# Patient Record
Sex: MALE | Race: AMERICAN INDIAN OR ALASKA NATIVE
[De-identification: names, ages, dates, MRNs, and addresses within clinical notes are randomized per-mention and may not be internally consistent; named-entity substitution may affect disease eponyms.]

---

## 2019-08-08 ENCOUNTER — HOSPITAL ENCOUNTER (INPATIENT)
Dept: HOSPITAL 5 - ED | Age: 29
LOS: 3 days | Discharge: HOME | DRG: 379 | End: 2019-08-11
Attending: INTERNAL MEDICINE | Admitting: INTERNAL MEDICINE
Payer: COMMERCIAL

## 2019-08-08 DIAGNOSIS — E03.9: ICD-10-CM

## 2019-08-08 DIAGNOSIS — K58.9: ICD-10-CM

## 2019-08-08 DIAGNOSIS — F12.90: ICD-10-CM

## 2019-08-08 DIAGNOSIS — K57.21: Primary | ICD-10-CM

## 2019-08-08 DIAGNOSIS — K59.00: ICD-10-CM

## 2019-08-08 DIAGNOSIS — Z80.0: ICD-10-CM

## 2019-08-08 DIAGNOSIS — F17.210: ICD-10-CM

## 2019-08-08 LAB
ALBUMIN SERPL-MCNC: 4.7 G/DL (ref 3.9–5)
ALT SERPL-CCNC: 15 UNITS/L (ref 7–56)
BASOPHILS # (AUTO): 0.1 K/MM3 (ref 0–0.1)
BASOPHILS NFR BLD AUTO: 0.5 % (ref 0–1.8)
BILIRUB UR QL STRIP: (no result)
BLOOD UR QL VISUAL: (no result)
BUN SERPL-MCNC: 7 MG/DL (ref 9–20)
BUN/CREAT SERPL: 8 %
CALCIUM SERPL-MCNC: 9.8 MG/DL (ref 8.4–10.2)
EOSINOPHIL # BLD AUTO: 0.2 K/MM3 (ref 0–0.4)
EOSINOPHIL NFR BLD AUTO: 0.9 % (ref 0–4.3)
HCT VFR BLD CALC: 44.6 % (ref 35.5–45.6)
HEMOLYSIS INDEX: 2
HGB BLD-MCNC: 15.4 GM/DL (ref 11.8–15.2)
LYMPHOCYTES # BLD AUTO: 1.5 K/MM3 (ref 1.2–5.4)
LYMPHOCYTES NFR BLD AUTO: 7.9 % (ref 13.4–35)
MCHC RBC AUTO-ENTMCNC: 34 % (ref 32–34)
MCV RBC AUTO: 99 FL (ref 84–94)
MONOCYTES # (AUTO): 1.8 K/MM3 (ref 0–0.8)
MONOCYTES % (AUTO): 9.8 % (ref 0–7.3)
MUCOUS THREADS #/AREA URNS HPF: (no result) /HPF
PH UR STRIP: 6 [PH] (ref 5–7)
PLATELET # BLD: 252 K/MM3 (ref 140–440)
PROT UR STRIP-MCNC: (no result) MG/DL
RBC # BLD AUTO: 4.52 M/MM3 (ref 3.65–5.03)
RBC #/AREA URNS HPF: 4 /HPF (ref 0–6)
UROBILINOGEN UR-MCNC: 4 MG/DL (ref ?–2)
WBC #/AREA URNS HPF: 1 /HPF (ref 0–6)

## 2019-08-08 PROCEDURE — 36415 COLL VENOUS BLD VENIPUNCTURE: CPT

## 2019-08-08 PROCEDURE — 81001 URINALYSIS AUTO W/SCOPE: CPT

## 2019-08-08 PROCEDURE — 80048 BASIC METABOLIC PNL TOTAL CA: CPT

## 2019-08-08 PROCEDURE — 85025 COMPLETE CBC W/AUTO DIFF WBC: CPT

## 2019-08-08 PROCEDURE — 74178 CT ABD&PLV WO CNTR FLWD CNTR: CPT

## 2019-08-08 PROCEDURE — 94760 N-INVAS EAR/PLS OXIMETRY 1: CPT

## 2019-08-08 PROCEDURE — 80053 COMPREHEN METABOLIC PANEL: CPT

## 2019-08-08 NOTE — EMERGENCY DEPARTMENT REPORT
Blank Doc





- Documentation


Documentation: 





This is a 29-year-old male that presents with abdominal pain and hematuria.  D

enies any n/v.





This initial assessment/diagnostic orders/clinical plan/treatment(s) is/are 

subject to change based on patient's health status, clinical progression and re-

assessment by fellow clinical providers in the ED.  Further treatment and workup

at subsequent clinical providers discretion.  Patient/guardians urged not to 

elope from the ED as their condition may be serious if not clinically assessed 

and managed.  Initial orders include:


1- Patient sent to MAIN ED for further evaluation and treatment


2- labs


3- UA

## 2019-08-08 NOTE — EMERGENCY DEPARTMENT REPORT
ED Abdominal Pain HPI





- General


Chief Complaint: Abdominal Pain


Stated Complaint: STOMACH PAIN/BLOOD IN STOOL/THYROID


Time Seen by Provider: 08/08/19 19:11


Source: patient


Mode of arrival: Wheelchair


Limitations: No Limitations





- History of Present Illness


Initial Comments: 





Patient is a 29-year-old male that since emergency room with abdominal pain 1 d

ay.  Patient states he is having left lower quadrant pain that is worsening.  

Patient states the pain is an 8 out of 10.  Patient states he is also having 

dysuria.  Patient also complaining of constipation.  Patient states the pain is 

radiating to the suprapubic region.  Patient states the pain is better with rest

and worse with movement.  Patient states that also like a refill of his thyroid 

medication.


MD Complaint: abdominal pain


-: Sudden


Location: LLQ


Radiation: suprapubic


Migration to: no migration


Severity: severe


Severity scale (0 -10): 8


Quality: stabbing


Consistency: constant


Improves With: rest


Worsens With: eating, movement


Associated Symptoms: constipation, dysuria, hematochezia.  denies: nausea, 

vomiting, diarrhea, fever, chills, hematemesis, melena, hematuria, syncope





- Related Data


                                Home Medications











 Medication  Instructions  Recorded  Confirmed  Last Taken


 


Levothyroxine [Synthroid] 75 mcg PO QAM 08/09/19 08/09/19 Unknown











                                    Allergies











Allergy/AdvReac Type Severity Reaction Status Date / Time


 


No Known Allergies Allergy   Unverified 08/08/19 18:45














ED Review of Systems


ROS: 


Stated complaint: STOMACH PAIN/BLOOD IN STOOL/THYROID


Other details as noted in HPI





Constitutional: denies: chills, fever


Eyes: denies: eye pain, eye discharge, vision change


ENT: denies: ear pain, throat pain


Respiratory: denies: cough, shortness of breath, wheezing


Cardiovascular: denies: chest pain, palpitations


Endocrine: no symptoms reported


Gastrointestinal: abdominal pain, constipation, hematochezia.  denies: nausea, 

vomiting, diarrhea


Genitourinary: dysuria.  denies: urgency


Musculoskeletal: denies: back pain, joint swelling, arthralgia


Skin: denies: rash, lesions


Neurological: denies: headache, weakness, paresthesias


Psychiatric: denies: anxiety, depression


Hematological/Lymphatic: denies: easy bleeding, easy bruising





ED Past Medical Hx





- Past Medical History


Previous Medical History?: Yes


Additional medical history: Hypothyroidism





- Surgical History


Past Surgical History?: No





- Family History


Family history: no significant





- Social History


Smoking Status: Current Every Day Smoker


Substance Use Type: Marijuana





- Medications


Home Medications: 


                                Home Medications











 Medication  Instructions  Recorded  Confirmed  Last Taken  Type


 


Levothyroxine [Synthroid] 75 mcg PO QAM 08/09/19 08/09/19 Unknown History














ED Physical Exam





- General


Limitations: No Limitations


General appearance: alert, in no apparent distress





- Head


Head exam: Present: atraumatic, normocephalic





- Eye


Eye exam: Present: normal appearance





- ENT


ENT exam: Present: mucous membranes moist





- Neck


Neck exam: Present: normal inspection





- Respiratory


Respiratory exam: Present: normal lung sounds bilaterally.  Absent: respiratory 

distress





- Cardiovascular


Cardiovascular Exam: Present: regular rate, normal rhythm.  Absent: systolic 

murmur, diastolic murmur, rubs, gallop





- GI/Abdominal


GI/Abdominal exam: Present: soft, tenderness (left lower quadrant tenderness to 

palpation), normal bowel sounds





- Rectal


Rectal exam: Present: deferred





- Extremities Exam


Extremities exam: Present: normal inspection





- Back Exam


Back exam: Present: normal inspection





- Neurological Exam


Neurological exam: Present: alert, oriented X3





- Psychiatric


Psychiatric exam: Present: normal affect, normal mood





- Skin


Skin exam: Present: warm, dry, intact, normal color.  Absent: rash





ED Course


                                   Vital Signs











  08/08/19 08/08/19 08/08/19





  19:12 22:32 23:45


 


Temperature 98.8 F  


 


Pulse Rate 81 74 


 


Respiratory 18 18 





Rate   


 


Blood Pressure 135/89  


 


Blood Pressure  142/85 





[Left]   


 


O2 Sat by Pulse 100 97 99





Oximetry   














- Reevaluation(s)


Reevaluation #1: 


I discussed all results with patient.  I discussed plan of care with patient.  

Patient agrees with plan of care.


08/08/19 22:59








- Consultations


Consultation #1: 


General surgery paged


08/08/19 22:59





I discussed case with general surgeon, Dr. Nayak.  Gen. surgery recommends 

admission, IV antibiotics and nothing by mouth after midnight.


08/08/19 23:04





Consultation #2: 


Hospitalist consulted for admission.  Hospitalist to admit the patient


08/08/19 23:07








ED Medical Decision Making





- Lab Data


Result diagrams: 


                                 08/08/19 19:21





                                 08/08/19 19:21





- Radiology Data


Radiology results: report reviewed








CT ABDOMEN AND PELVIS WITHOUT AND WITH CONTRAST 





INDICATION / CLINICAL INFORMATION: 


Left lower quadrant abdominal pain. 





TECHNIQUE: 


Axial CT images were obtained through the abdomen and pelvis before and after 

100 mL Omnipaque 300 


IV contrast. All CT scans at this location are performed using CT dose reduction

 for ALARA by means


of automated exposure control. 





COMPARISON: 


None available. 





FINDINGS: 


LOWER CHEST: No significant abnormality. 


LIVER: No significant abnormality. 


GALLBLADDER: No significant abnormality. 


BILE DUCTS: No significant abnormality. 


PANCREAS: No significant abnormality. 


SPLEEN: No significant abnormality. 


ADRENALS: No significant abnormality. 


RIGHT KIDNEY and URETER: No significant abnormality. 


LEFT KIDNEY and URETER: No significant abnormality. 





STOMACH and SMALL BOWEL: No significant abnormality. 


COLON: Mild diffuse colonic diverticulosis. Moderate sigmoid diverticulosis. 

There is moderate 


pericolonic inflammation adjacent to the mid sigmoid colon. There is a small 1.2

 cm intramural 


abscess along the posterior wall of the sigmoid colon as seen on axial series 5 

image 134. There is 


no drainable abscess at this time. 


APPENDIX: No significant abnormality. 


PERITONEUM: Small amount of free fluid in the pelvis. No free air. No fluid 

collection. 


LYMPH NODES: No significant adenopathy. 


AORTA and ARTERIES: No significant abnormality. 


IVC and VEINS: No significant abnormality. 





URINARY BLADDER: No significant abnormality. 


REPRODUCTIVE ORGANS: No significant abnormality. 





ADDITIONAL FINDINGS: None. 





SKELETAL SYSTEM: No significant abnormality. 





IMPRESSION: 


1. Acute sigmoid diverticulitis with small sigmoid intramural abscess. No free 

air or drainable 


abscess. 








- Medical Decision Making





Patient is a 29-year-old male that since emergency with complaints of left lower

 quadrant abdominal pain and bright red blood per rectum, Constipation..  

Patient had CT done and shows diverticulitis of the sigmoid colon with an 

intraoral abscess.  Patient also got an elevated WBC.  Rest of his labs are 

negative.  Patient will be admitted to the hospital service.  Patient will be 

given IV antibiotics and fluids.  Gen. surgery consulted.





- Differential Diagnosis


diverticulitis.  Left lower quadrant abdominal pain.  Constipation.


Critical Care Time: Yes


Critical care attestation.: 


If time is entered above; I have spent that time in minutes in the direct care 

of this critically ill patient, excluding procedure time.





Critical Care Time: 





35 minutes





ED Disposition


Clinical Impression: 


 BRBPR (bright red blood per rectum)





Abdominal pain


Qualifiers:


 Abdominal location: left lower quadrant Qualified Code(s): R10.32 - Left lower 

quadrant pain





Diverticulitis large intestine


Qualifiers:


 Diverticulitis bleeding: with bleeding Diverticulitis complication: with 

abscess Qualified Code(s): K57.21 - Diverticulitis of large intestine with 

perforation and abscess with bleeding





Constipation


Qualifiers:


 Constipation type: unspecified constipation type Qualified Code(s): K59.00 - 

Constipation, unspecified





Disposition: DC-09 OP ADMIT IP TO THIS HOSP


Is pt being admited?: Yes


Does the pt Need Aspirin: No


Condition: Critical


Time of Disposition: 23:09

## 2019-08-08 NOTE — HISTORY AND PHYSICAL REPORT
History of Present Illness


Date of examination: 08/08/19


History of present illness: 


29-year-old male with a history of hypothyroidism because emergency room with 

complaints of abdominal pain which has been ongoing over the last 4-5 months.  

Pain is in the left lower quadrant, sharp, sometimes dull, intensity 7/10, radi

ating to the scrotum, relieved with pain medication gives a emergency room.  He 

is at nausea vomiting, none today.  He is also noted blood in his stool on 

several occasion with the abdominal pain no NSAID use


eview Of  Systems:


Constitutional: no weight loss, fever, chills


Ears, eyes, nose, mouth and throat: no nasal congestion, no nasal discharge, no 

sinus pressure, blurry vision, diplopia


Neck: No neck pain or rigidity.


Cardiovascular: No  palpitations, chest pain


Respiratory: No shortness of breath, cough


Gastrointestinal:+hematochezia, abdominal pain


Genitourinary : no dysuria, frequency , hematuria


Musculoskeletal: no muscle ache , joint pain


Integumentary: no rash, no pruritis


Neurological: no parathesias, focal weakness


Endocrine: no cold or heat intolerance, no polyuria or polydipsia


Hematologic/Lymphatic: no easy bruising, no easy bleeding, no gland swelling


Allergic/Immunologic: no urticaria, no angioedema.





SPAST MEDICAL HISTORY:hypothyroidism





PAST SURGICAL HISTORY: None





FAMILY HISTORY:hypertension, diabetes





SOCIAL HISTORY: + tobacco, + marijuana,  no alcohol








Medications and Allergies


                                    Allergies











Allergy/AdvReac Type Severity Reaction Status Date / Time


 


No Known Allergies Allergy   Unverified 08/08/19 18:45











                                Home Medications











 Medication  Instructions  Recorded  Confirmed  Last Taken  Type


 


Levothyroxine [Synthroid] 75 mcg PO QAM 08/09/19 08/09/19 Unknown History


 


Ciprofloxacin HCl [Ciprofloxacin 500 mg PO Q12HR #28 tab 08/10/19  Unknown Rx





TAB]     


 


metroNIDAZOLE [Flagyl TAB] 500 mg PO Q12HR #28 tab 08/10/19  Unknown Rx











Active Meds: 


Active Medications





Sodium Chloride (Nacl 0.9% 1000 Ml)  1,000 mls @ 999 mls/hr IV BOLUS ONE


   Stop: 08/09/19 00:10


   Last Admin: 08/08/19 23:27 Dose:  999 mls/hr


   Documented by: 











Exam





- Physical Exam


Narrative exam: 


General Apperance: The patient sitting in bed no acute distress


HEENT: Normocephalic, atraumatic.  Pupils equally round and reactive to light, 

extraocular movement intact, and no sclericterus or JVD or thyromegaly or 

nodule.  Neck supple, no carotid bruit, mucous membranes moist, no exudate or 

erythema


Heart: S1-S2, regular is rhythm


Lungs: Clear to auscultation bilaterally, breathing comfortable


Abdomen: Positive bowel sounds, soft, tender left lower quadrant, nondistended, 

no organomegaly


Extremities: No edema cyanosis clubbing


Skin: no rash, nodule, warm and dry


Neuro:CN 2 -12 intact, motor/sensory intact, speech is fluent








- Constitutional


Vitals: 


                                        











Temp Pulse Resp BP Pulse Ox


 


 98.8 F   74   18   142/85   97 


 


 08/08/19 19:12  08/08/19 22:32  08/08/19 22:32  08/08/19 22:32  08/08/19 22:32














Results





- Labs


CBC & Chem 7: 


                                 08/10/19 05:10





                                 08/09/19 04:43


Labs: 


                              Abnormal lab results











  08/08/19 08/08/19 Range/Units





  19:21 19:21 


 


WBC  18.6 H   (4.5-11.0)  K/mm3


 


Hgb  15.4 H   (11.8-15.2)  gm/dl


 


MCV  99 H   (84-94)  fl


 


MCH  34 H   (28-32)  pg


 


Lymph % (Auto)  7.9 L   (13.4-35.0)  %


 


Mono % (Auto)  9.8 H   (0.0-7.3)  %


 


Mono #  1.8 H   (0.0-0.8)  K/mm3


 


Seg Neutrophils %  80.9 H   (40.0-70.0)  %


 


Seg Neutrophils #  15.0 H   (1.8-7.7)  K/mm3


 


Sodium   136 L  (137-145)  mmol/L


 


Chloride   97.7 L  ()  mmol/L


 


BUN   7 L  (9-20)  mg/dL














- Imaging and Cardiology


EKG: image reviewed


CT scan - abdomen: report reviewed


CT scan - pelvis: report reviewed





Assessment and Plan


Assessment


Abdominal abscess


Blood per rectum, ?hemmorroids


Hypothyroidism


Plan


Admit to medicine


Start IV fluid, IV Zosyn, IV morphine, follow cultures


Consult GI, surgery, hemoglobin stable


DVT prophylaxis

## 2019-08-08 NOTE — CAT SCAN REPORT
CT ABDOMEN AND PELVIS WITHOUT AND WITH CONTRAST



INDICATION / CLINICAL INFORMATION:

Left lower quadrant abdominal pain.



TECHNIQUE:

Axial CT images were obtained through the abdomen and pelvis before and after 100 mL Omnipaque 300 IV
 contrast.  All CT scans at this location are performed using CT dose reduction for ALARA by means of
 automated exposure control. 



COMPARISON:

None available.



FINDINGS:

LOWER CHEST: No significant abnormality.

LIVER: No significant abnormality.

GALLBLADDER: No significant abnormality.  

BILE DUCTS: No significant abnormality.

PANCREAS: No significant abnormality.

SPLEEN: No significant abnormality.

ADRENALS: No significant abnormality.

RIGHT KIDNEY and URETER: No significant abnormality.

LEFT KIDNEY and URETER: No significant abnormality.



STOMACH and SMALL BOWEL: No significant abnormality. 

COLON: Mild diffuse colonic diverticulosis. Moderate sigmoid diverticulosis. There is moderate ana
lonic inflammation adjacent to the mid sigmoid colon. There is a small 1.2 cm intramural abscess jay
g the posterior wall of the sigmoid colon as seen on axial series 5 image 134. There is no drainable 
abscess at this time. 

APPENDIX: No significant abnormality.  

PERITONEUM: Small amount of free fluid in the pelvis. No free air. No fluid collection.

LYMPH NODES: No significant adenopathy.

AORTA and ARTERIES: No significant abnormality. 

IVC and VEINS: No significant abnormality.



URINARY BLADDER: No significant abnormality.

REPRODUCTIVE ORGANS: No significant abnormality.



ADDITIONAL FINDINGS: None.



SKELETAL SYSTEM: No significant abnormality.



IMPRESSION:

1. Acute sigmoid diverticulitis with small sigmoid intramural abscess. No free air or drainable absce
ss.

 



Signer Name: MARIA ELENA Ryan MD 

Signed: 8/8/2019 10:44 PM

 Workstation Name: [id number]

## 2019-08-09 LAB
BASOPHILS # (AUTO): 0 K/MM3 (ref 0–0.1)
BASOPHILS NFR BLD AUTO: 0.3 % (ref 0–1.8)
BUN SERPL-MCNC: 8 MG/DL (ref 9–20)
BUN/CREAT SERPL: 9 %
CALCIUM SERPL-MCNC: 9 MG/DL (ref 8.4–10.2)
EOSINOPHIL # BLD AUTO: 0.4 K/MM3 (ref 0–0.4)
EOSINOPHIL NFR BLD AUTO: 3.4 % (ref 0–4.3)
HCT VFR BLD CALC: 42 % (ref 35.5–45.6)
HEMOLYSIS INDEX: 6
HGB BLD-MCNC: 14.3 GM/DL (ref 11.8–15.2)
LYMPHOCYTES # BLD AUTO: 2 K/MM3 (ref 1.2–5.4)
LYMPHOCYTES NFR BLD AUTO: 18.7 % (ref 13.4–35)
MCHC RBC AUTO-ENTMCNC: 34 % (ref 32–34)
MCV RBC AUTO: 100 FL (ref 84–94)
MONOCYTES # (AUTO): 1.5 K/MM3 (ref 0–0.8)
MONOCYTES % (AUTO): 14.2 % (ref 0–7.3)
PLATELET # BLD: 234 K/MM3 (ref 140–440)
RBC # BLD AUTO: 4.2 M/MM3 (ref 3.65–5.03)

## 2019-08-09 RX ADMIN — SODIUM CHLORIDE SCH MLS/HR: 0.9 INJECTION, SOLUTION INTRAVENOUS at 11:00

## 2019-08-09 RX ADMIN — PIPERACILLIN AND TAZOBACTAM SCH MLS/HR: 4; .5 INJECTION, POWDER, FOR SOLUTION INTRAVENOUS at 17:03

## 2019-08-09 RX ADMIN — Medication SCH ML: at 10:16

## 2019-08-09 RX ADMIN — PIPERACILLIN AND TAZOBACTAM SCH MLS/HR: 4; .5 INJECTION, POWDER, FOR SOLUTION INTRAVENOUS at 06:49

## 2019-08-09 RX ADMIN — SODIUM CHLORIDE SCH MLS/HR: 0.9 INJECTION, SOLUTION INTRAVENOUS at 21:44

## 2019-08-09 RX ADMIN — SODIUM CHLORIDE SCH MLS/HR: 0.9 INJECTION, SOLUTION INTRAVENOUS at 02:29

## 2019-08-09 RX ADMIN — MORPHINE SULFATE PRN MG: 2 INJECTION, SOLUTION INTRAMUSCULAR; INTRAVENOUS at 02:17

## 2019-08-09 RX ADMIN — PIPERACILLIN AND TAZOBACTAM SCH MLS/HR: 4; .5 INJECTION, POWDER, FOR SOLUTION INTRAVENOUS at 12:15

## 2019-08-09 NOTE — PROGRESS NOTE
Assessment and Plan


Assessment and plan: 


29-year-old male with a history of hypothyroidism because emergency room with 

complaints of abdominal pain which has been ongoing over the last 4-5 months.  

Pain is in the left lower quadrant, sharp, sometimes dull, intensity 7/10, 

radiating to the scrotum, relieved with pain medication gives a emergency room. 

He is at nausea vomiting, none today.  He is also noted blood in his stool on 

several occasion with the abdominal pain no NSAID use








Acute Diverticulitis


BRPR secondary to above


Hypothyrodisim





Plan


Supportive care


Continue abx


continue IVF


Keep NPO


PAIN CONTROL


SURGERY, ID, GI input noted


DVT/GI PROPHY


Discussed with GI and outpatient colonoscopy in 6 weeks to r/o mass or IBD.  





History


Interval history: 


Patient seen and examined today, report abdominal pain is improving. 








Hospitalist Physical





- Constitutional


Vitals: 


                                        











Temp Pulse Resp BP Pulse Ox


 


 97.9 F   71   18   131/80   98 


 


 08/09/19 07:28  08/09/19 07:28  08/09/19 07:28  08/09/19 07:28  08/09/19 07:36











General appearance: Present: no acute distress





- EENT


Eyes: Present: PERRL, EOM intact


ENT: hearing intact, clear oral mucosa





- Neck


Neck: Present: supple, normal ROM





- Respiratory


Respiratory effort: normal


Respiratory: bilateral: CTA





- Cardiovascular


Rhythm: regular


Heart Sounds: Present: S1 & S2, systolic murmur





- Extremities


Extremities: no ischemia, pulses intact, pulses symmetrical, No edema, Full ROM


Peripheral Pulses: within normal limits





- Abdominal


General gastrointestinal: soft, non-distended, normal bowel sounds


Localized gastrointestinal: tender: LLQ





- Integumentary


Integumentary: Present: clear, warm





- Psychiatric


Psychiatric: appropriate mood/affect, intact judgment & insight, memory intact, 

cooperative





- Neurologic


Neurologic: CNII-XII intact, moves all extremities





- Allied Health


Allied health notes reviewed: nursing





Results





- Labs


CBC & Chem 7: 


                                 08/10/19 05:10





                                 08/09/19 04:43


Labs: 


                             Laboratory Last Values











WBC  10.6 K/mm3 (4.5-11.0)   08/09/19  04:43    


 


RBC  4.20 M/mm3 (3.65-5.03)   08/09/19  04:43    


 


Hgb  14.3 gm/dl (11.8-15.2)   08/09/19  04:43    


 


Hct  42.0 % (35.5-45.6)   08/09/19  04:43    


 


MCV  100 fl (84-94)  H  08/09/19  04:43    


 


MCH  34 pg (28-32)  H  08/09/19  04:43    


 


MCHC  34 % (32-34)   08/09/19  04:43    


 


RDW  14.5 % (13.2-15.2)   08/09/19  04:43    


 


Plt Count  234 K/mm3 (140-440)   08/09/19  04:43    


 


Lymph % (Auto)  18.7 % (13.4-35.0)   08/09/19  04:43    


 


Mono % (Auto)  14.2 % (0.0-7.3)  H  08/09/19  04:43    


 


Eos % (Auto)  3.4 % (0.0-4.3)   08/09/19  04:43    


 


Baso % (Auto)  0.3 % (0.0-1.8)   08/09/19  04:43    


 


Lymph #  2.0 K/mm3 (1.2-5.4)   08/09/19  04:43    


 


Mono #  1.5 K/mm3 (0.0-0.8)  H  08/09/19  04:43    


 


Eos #  0.4 K/mm3 (0.0-0.4)   08/09/19  04:43    


 


Baso #  0.0 K/mm3 (0.0-0.1)   08/09/19  04:43    


 


Seg Neutrophils %  63.4 % (40.0-70.0)   08/09/19  04:43    


 


Seg Neutrophils #  6.8 K/mm3 (1.8-7.7)   08/09/19  04:43    


 


Sodium  140 mmol/L (137-145)   08/09/19  04:43    


 


Potassium  4.2 mmol/L (3.6-5.0)   08/09/19  04:43    


 


Chloride  102.6 mmol/L ()   08/09/19  04:43    


 


Carbon Dioxide  26 mmol/L (22-30)   08/09/19  04:43    


 


  16 mmol/L  08/09/19  04:43    


 


BUN  8 mg/dL (9-20)  L  08/09/19  04:43    


 


  0.9 mg/dL (0.8-1.5)   08/09/19  04:43    


 


Estimated GFR  > 60 ml/min  08/09/19  04:43    


 


  9 %  08/09/19  04:43    


 


Glucose  80 mg/dL ()   08/09/19  04:43    


 


Calcium  9.0 mg/dL (8.4-10.2)   08/09/19  04:43    


 


  0.70 mg/dL (0.1-1.2)   08/08/19  19:21    


 


AST  19 units/L (5-40)   08/08/19  19:21    


 


ALT  15 units/L (7-56)   08/08/19  19:21    


 


  49 units/L ()   08/08/19  19:21    


 


  7.6 g/dL (6.3-8.2)   08/08/19  19:21    


 


  4.7 g/dL (3.9-5)   08/08/19  19:21    


 


  1.6 %  08/08/19  19:21    


 


  Yellow  (Yellow)   08/08/19  20:35    


 


  Clear  (Clear)   08/08/19  20:35    


 


  6.0  (5.0-7.0)   08/08/19  20:35    


 


Ur Specific Gravity  1.025  (1.003-1.030)   08/08/19  20:35    


 


  <15 mg/dl mg/dL (Negative)   08/08/19  20:35    


 


  Neg mg/dL (Negative)   08/08/19  20:35    


 


  Tr mg/dL (Negative)   08/08/19  20:35    


 


  Neg  (Negative)   08/08/19  20:35    


 


  Neg  (Negative)   08/08/19  20:35    


 


  Neg  (Negative)   08/08/19  20:35    


 


  4.0 mg/dL (<2.0)   08/08/19  20:35    


 


Ur Leukocyte Esterase  Neg  (Negative)   08/08/19  20:35    


 


  1.0 /HPF (0.0-6.0)   08/08/19  20:35    


 


  4.0 /HPF (0.0-6.0)   08/08/19  20:35    


 


  Few /HPF  08/08/19  20:35    














Active Medications





- Current Medications


Current Medications: 














Generic Name Dose Route Start Last Admin





  Trade Name Freq  PRN Reason Stop Dose Admin


 


Acetaminophen  650 mg  08/08/19 23:36 





  Tylenol  PO  





  Q4H PRN  





  Pain MILD(1-3)/Fever >100.5/HA  


 


Sodium Chloride  1,000 mls @ 150 mls/hr  08/08/19 23:45  08/09/19 11:00





  Nacl 0.9% 1000 Ml  IV   150 mls/hr





  AS DIRECT YANIRA   Administration


 


Piperacillin Sod/Tazobactam Sod  4.5 gm in 100 mls @ 200 mls/hr  08/09/19 06:00 

08/09/19 12:15





  Zosyn/Ns 4.5gm/100ml  IV   200 mls/hr





  Q6HR YANIRA   Administration





  Protocol  


 


Morphine Sulfate  2 mg  08/08/19 23:36  08/09/19 02:17





  Morphine  IV   2 mg





  Q4H PRN   Administration





  Pain, Moderate (4-6)  


 


Ondansetron HCl  4 mg  08/08/19 23:36  08/09/19 02:19





  Zofran  IV   4 mg





  Q4H PRN   Administration





  Nausea And Vomiting  


 


Sodium Chloride  10 ml  08/09/19 10:00  08/09/19 10:16





  Sodium Chloride Flush Syringe 10 Ml  IV   10 ml





  BID YANIRA   Administration


 


Sodium Chloride  10 ml  08/08/19 23:36 





  Sodium Chloride Flush Syringe 10 Ml  IV  





  PRN PRN  





  LINE FLUSH

## 2019-08-09 NOTE — CONSULTATION
REASON FOR CONSULTATION:  Rule out diverticulitis.



HISTORY OF PRESENT ILLNESS:  The patient is a healthy 29-year-old gentleman, who

presented to the Emergency Room with recent onset of left lower quadrant

abdominal pain radiating to the suprapubic region.  The patient also states it

hurts some when he urinates.  States he had one episode of vomiting, but

otherwise no nausea and vomiting.  States he has probably had " 

The

patient also states this pain has been going 

months,

but the pain acutely exacerbated yesterday, thus requiring his arrival to the

Emergency Room.



PAST MEDICAL HISTORY:  Pertinent for hypothyroidism.  The patient is supposed to

be taking Synthroid, but it is not taking it because he has no primary care

physician.



PAST SURGICAL HISTORY:  Negative.



ALLERGIES:  No known allergies.



MEDICATIONS:  Again, the patient should be taking Synthroid.



FAMILY HISTORY:  Thyroid disease and colon cancer.



SOCIAL HISTORY:  Admits to smoking marijuana and cigarettes.  States occasional

ethanol intake.



REVIEW OF SYSTEMS:  Noncontributory.



PHYSICAL EXAMINATION:  

GENERAL:  At this time reveals the patient to be awake, alert, and cooperative. 

States he is "

VITAL SIGNS:  Temperature currently is 97.9, blood pressure 131/80, pulse is 71,

respirations of 18.

ABDOMEN:  Examination of the abdomen reveals to be soft and nontender at

present.  Bowel sounds are present.



LABORATORY DATA:  Includes a CBC, which shows a white count of 10.6.  Apparently

white count was 18.6 on admission.  The patient has since been placed on

piperacillin.  H and H is 14.3 and 42.0.  Electrolytes are all within normal

limits.  LFTs are also within normal limits.  CT scan of the abdomen has been

done which I reviewed with the radiologist.  There are indeed findings

consistent with sigmoid diverticulitis.  There may be very small early onset

abscess formation certainly too small to drain at this time.  There is no

evidence of any free air or free perforation.



IMPRESSION:  At this time is that of a 29-year-old gentleman, rule out

diverticulitis with no true abscess formation or perforation at this time.



RECOMMENDATIONS:  Recommend keeping the patient n.p.o. except for ice chips and

meds.  May begin attempt of clear liquid diet in 24-48 hours once the patient's

pain completely subsides and is not requiring any narcotics.  Also we will

obtain ID evaluation and GI evaluation.  The patient will eventually need a

colonoscopy in 6-8 weeks once the acute process subsides again taking into

account the patient's recent history of on and off pain in this area for the

last 6 months and also the fact that the patient has a positive family history

of colon cancer.  I will follow with you.



Thank you very much for consultation.





DD: 08/09/2019 10:16

DT: 08/09/2019 16:45

JOB# [id number]  [id number]

TWAN/SIMON

## 2019-08-09 NOTE — CONSULTATION
History of Present Illness





- Reason for Consult


Consult date: 08/09/19





- History of Present Illness





30 yo M PMHx hypothyroidism initially presented to the ER complaining of 

abdominal pain. He notes the pain initially started approximately 4 months ago 

in the LLQ which radiated to the scrotum and was radiated a 7/10. He relieved 

the pain with pain medications, however it continued to progress, eventually 

culminating with nausea and vomiting which prompted his ER visit. He also noted 

some blood in his stool throughout the course of his illness. He feels improved 

today and would like to start eating. 





Afebrile since admission with a white count that was initially elevated to 19 

but has since returned to normal. He is currently receiving pip-tazo. No 

cultures are available for review. A CT of the abdomen and pelvis revealed 

sigmoid diverticulitis with a small sigmoid intramural abscess. There was no 

drainable abscess, however. 








Past History


Past Medical History: hyperthyroidism


Past Surgical History: No surgical history


Social history: denies: smoking, alcohol abuse


Family history: no significant family history





Medications and Allergies


                                    Allergies











Allergy/AdvReac Type Severity Reaction Status Date / Time


 


No Known Allergies Allergy   Unverified 08/08/19 18:45











                                Home Medications











 Medication  Instructions  Recorded  Confirmed  Last Taken  Type


 


Levothyroxine [Synthroid] 75 mcg PO QAM 08/09/19 08/09/19 Unknown History











Active Meds: 


Active Medications





Acetaminophen (Tylenol)  650 mg PO Q4H PRN


   PRN Reason: Pain MILD(1-3)/Fever >100.5/HA


Sodium Chloride (Nacl 0.9% 1000 Ml)  1,000 mls @ 150 mls/hr IV AS DIRECT YANIRA


   Last Admin: 08/09/19 02:29 Dose:  150 mls/hr


   Documented by: 


Piperacillin Sod/Tazobactam Sod (Zosyn/Ns 4.5gm/100ml)  4.5 gm in 100 mls @ 200 

mls/hr IV Q6HR YANIRA; Protocol


   Last Admin: 08/09/19 06:49 Dose:  200 mls/hr


   Documented by: 


Morphine Sulfate (Morphine)  2 mg IV Q4H PRN


   PRN Reason: Pain, Moderate (4-6)


   Last Admin: 08/09/19 02:17 Dose:  2 mg


   Documented by: 


Ondansetron HCl (Zofran)  4 mg IV Q4H PRN


   PRN Reason: Nausea And Vomiting


   Last Admin: 08/09/19 02:19 Dose:  4 mg


   Documented by: 


Sodium Chloride (Sodium Chloride Flush Syringe 10 Ml)  10 ml IV BID YANIRA


Sodium Chloride (Sodium Chloride Flush Syringe 10 Ml)  10 ml IV PRN PRN


   PRN Reason: LINE FLUSH











Review of Systems


Constitutional: no fever, no chills, no sweats


Ears, nose, mouth and throat: no sinus pain, no dysphagia, no sore throat


Cardiovascular: no chest pain, no orthopnea, no palpitations


Respiratory: no cough, no shortness of breath, no dyspnea on exertion


Gastrointestinal: abdominal pain, nausea, vomiting, hematochezia, no diarrhea


Genitourinary Male: no dysuria, no flank pain, no urinary frequency


Musculoskeletal: no neck pain, no low back pain, no redness of joints


Integumentary: no rash, no pruritis, no redness, no sores


Neurological: no weakness, no parathesias, no numbness


Endocrine: no polydipsia, no polyuria, no nocturia


Hematologic/Lymphatic: no easy bruising, no easy bleeding, no lymphadenopathy





Physical Examination





- Physical Exam


Narrative exam: 





Constitutional: awake, no distress, following commands


Head, Ears, Nose: Normocephalic, atraumatic. External ears, nose normal


Eyes: Conjunctivae/corneas clear. No icterus. No ptosis.


Neck: Supple, no meningeal signs


Oral: fair dentition, moist mucous membranes


Cardiovascular: S1, S2 normal. Normal rhythm


Respiratory: Good air entry, clear to auscultation bilaterally


GI: Soft, non-tender; bowel sounds normal. No peritoneal signs


Musculoskeletal: No pedal edema, 


Skin: No rash or abscess


Hem/Lymphatic: No palpable cervical or supraclavicular nodes. No lymphangitis


Psych: no agitation


Neurological: Moves all extremities, no focal defects





- Constitutional


Vitals: 


                                   Vital Signs











Temp Pulse Resp BP Pulse Ox


 


 97.9 F   71   18   131/80   98 


 


 08/09/19 07:28  08/09/19 07:28  08/09/19 07:28  08/09/19 07:28  08/09/19 07:36








                           Temperature -Last 24 Hours











Temperature                    97.9 F


 


Temperature                    97.9 F


 


Temperature                    98.0 F


 


Temperature                    98.0 F


 


Temperature                    98.8 F

















Results





- Labs


CBC & Chem 7: 


                                 08/09/19 04:43





                                 08/09/19 04:43


Labs: 


                              Abnormal lab results











  08/08/19 08/08/19 08/09/19 Range/Units





  19:21 19:21 04:43 


 


WBC  18.6 H    (4.5-11.0)  K/mm3


 


Hgb  15.4 H    (11.8-15.2)  gm/dl


 


MCV  99 H   100 H  (84-94)  fl


 


MCH  34 H   34 H  (28-32)  pg


 


Lymph % (Auto)  7.9 L    (13.4-35.0)  %


 


Mono % (Auto)  9.8 H   14.2 H  (0.0-7.3)  %


 


Mono #  1.8 H   1.5 H  (0.0-0.8)  K/mm3


 


Seg Neutrophils %  80.9 H    (40.0-70.0)  %


 


Seg Neutrophils #  15.0 H    (1.8-7.7)  K/mm3


 


Sodium   136 L   (137-145)  mmol/L


 


Chloride   97.7 L   ()  mmol/L


 


BUN   7 L   (9-20)  mg/dL














  08/09/19 Range/Units





  04:43 


 


WBC   (4.5-11.0)  K/mm3


 


Hgb   (11.8-15.2)  gm/dl


 


MCV   (84-94)  fl


 


MCH   (28-32)  pg


 


Lymph % (Auto)   (13.4-35.0)  %


 


Mono % (Auto)   (0.0-7.3)  %


 


Mono #   (0.0-0.8)  K/mm3


 


Seg Neutrophils %   (40.0-70.0)  %


 


Seg Neutrophils #   (1.8-7.7)  K/mm3


 


Sodium   (137-145)  mmol/L


 


Chloride   ()  mmol/L


 


BUN  8 L  (9-20)  mg/dL














- Imaging and Cardiology


CT scan - abdomen: image reviewed





Assessment and Plan


A/P:


30 yo M PMHx hypothyroidism admitted with diverticulitis





1. Diverticulitis - Improving on pip-tazo. Continue IV antibiotics while 

inpatient. When ready for discharge, I would send home on ciprofloxacin and 

metronidazole. He should follow up in my clinic as well. He should complete 2 

total weeks of therapy, including time on pip-tazo. 





2. Hypothyroidism





Recs:


- continue pip-tazo while inpatient.


- on discharge please send out with ciprofloxacin PO 500mg q12h and 

metronidazole 500mg q8h. The stop date will be 8/22


- follow up with me on 8/21. Sent to . 





Amanda Stafford MD


[facility] ([facility])


C: [phone]


O: [phone]


F: [phone]

## 2019-08-09 NOTE — PROGRESS NOTE
Assessment and Plan





Full consult dictated:





28 y/o male admitted secondary to LLQ & suprapubic abd pain.   Pt states feeling

much better





Abd soft,  non tender at present





CT reviewed with radiologist.   sigmoid diverticulitis.  no free perforation.  

possible early small abscess formation.





imp  





diverticulitis


surgically stable





rec 


NPO except for ice chips and meds





may begin cl liq in 24 to 48 hrs once pain completely subsides and not requiring

narcotics.





ID eval


GI eval -  will need colonoscopy in 6-8 wks once acute process subsides.   (Pt 

states has had this pain "on & off"  for 6 months.  also FH of colon CA)





will follow





Date of examination: 08/08/19


History of present illness: 


29-year-old male with a history of hypothyroidism because emergency room with 

complaints of abdominal pain which has been ongoing over the last 4-5 months.  

Pain is in the left lower quadrant, sharp, sometimes dull, intensity 7/10, 

radiating to the scrotum, relieved with pain medication gives a emergency room. 

He is at nausea vomiting, none today.  He is also noted blood in his stool on 

several occasion with the abdominal pain no NSAID use


eview Of  Systems:


Constitutional: no weight loss, fever, chills


Ears, eyes, nose, mouth and throat: no nasal congestion, no nasal discharge, no 

sinus pressure, blurry vision, diplopia


Neck: No neck pain or rigidity.


Cardiovascular: No  palpitations, chest pain


Respiratory: No shortness of breath, cough


Gastrointestinal:+hematochezia, abdominal pain


Genitourinary : no dysuria, frequency , hematuria


Musculoskeletal: no muscle ache , joint pain


Integumentary: no rash, no pruritis


Neurological: no parathesias, focal weakness


Endocrine: no cold or heat intolerance, no polyuria or polydipsia


Hematologic/Lymphatic: no easy bruising, no easy bleeding, no gland swelling


Allergic/Immunologic: no urticaria, no angioedema.





SPAST MEDICAL HISTORY:hypothyroidism





PAST SURGICAL HISTORY: None





FAMILY HISTORY:hypertension, diabetes





SOCIAL HISTORY: + tobacco, + marijuana,  no alcohol








                                Selected Entries











  08/09/19





  07:28


 


Temperature 97.9 F


 


Pulse Rate 71


 


Respiratory 18





Rate 


 


Blood Pressure 131/80








                                Laboratory Tests











  08/09/19 08/09/19





  04:43 04:43


 


WBC  10.6 


 


Hgb  14.3 


 


Hct  42.0 


 


Sodium   140


 


Potassium   4.2


 


Chloride   102.6


 


Carbon Dioxide   26


 


Anion Gap   16


 


BUN   8 L














Objective


                               Vital Signs - 12hr











  08/08/19 08/08/19 08/08/19





  22:32 23:23 23:30


 


Temperature   


 


Pulse Rate 74  


 


Respiratory 18  





Rate   


 


Blood Pressure  138/84 138/84


 


Blood Pressure 142/85  





[Left]   


 


O2 Sat by Pulse 97 100 99





Oximetry   














  08/08/19 08/08/19 08/09/19





  23:45 23:59 00:01


 


Temperature   


 


Pulse Rate   


 


Respiratory   





Rate   


 


Blood Pressure   135/80


 


Blood Pressure   





[Left]   


 


O2 Sat by Pulse 99 99 99





Oximetry   














  08/09/19 08/09/19 08/09/19





  00:11 00:21 00:30


 


Temperature   


 


Pulse Rate   


 


Respiratory   





Rate   


 


Blood Pressure 138/84 138/84 138/84


 


Blood Pressure   





[Left]   


 


O2 Sat by Pulse 99 98 99





Oximetry   














  08/09/19 08/09/19 08/09/19





  01:16 01:19 03:04


 


Temperature 98.0 F 98.0 F 


 


Pulse Rate 55 L  


 


Respiratory 16 24 





Rate   


 


Blood Pressure 124/79  


 


Blood Pressure   





[Left]   


 


O2 Sat by Pulse 97  97





Oximetry   














  08/09/19 08/09/19 08/09/19





  04:28 07:28 07:36


 


Temperature 97.9 F 97.9 F 


 


Pulse Rate 65 71 


 


Respiratory 16 18 





Rate   


 


Blood Pressure 128/85 131/80 


 


Blood Pressure   





[Left]   


 


O2 Sat by Pulse 97 96 98





Oximetry   














- Labs





                                 08/09/19 04:43





                                 08/09/19 04:43


                                 Diabetes panel











  08/08/19 08/09/19 Range/Units





  19:21 04:43 


 


Sodium  136 L  140  (137-145)  mmol/L


 


Potassium  4.0  4.2  (3.6-5.0)  mmol/L


 


Chloride  97.7 L  102.6  ()  mmol/L


 


Carbon Dioxide  28  26  (22-30)  mmol/L


 


BUN  7 L  8 L  (9-20)  mg/dL


 


Creatinine  0.9  0.9  (0.8-1.5)  mg/dL


 


Glucose  94  80  ()  mg/dL


 


Calcium  9.8  9.0  (8.4-10.2)  mg/dL


 


AST  19   (5-40)  units/L


 


ALT  15   (7-56)  units/L


 


Alkaline Phosphatase  49   ()  units/L


 


Total Protein  7.6   (6.3-8.2)  g/dL


 


Albumin  4.7   (3.9-5)  g/dL








                                  Calcium panel











  08/08/19 08/09/19 Range/Units





  19:21 04:43 


 


Calcium  9.8  9.0  (8.4-10.2)  mg/dL


 


Albumin  4.7   (3.9-5)  g/dL








                                 Pituitary panel











  08/08/19 08/09/19 Range/Units





  19:21 04:43 


 


Sodium  136 L  140  (137-145)  mmol/L


 


Potassium  4.0  4.2  (3.6-5.0)  mmol/L


 


Chloride  97.7 L  102.6  ()  mmol/L


 


Carbon Dioxide  28  26  (22-30)  mmol/L


 


BUN  7 L  8 L  (9-20)  mg/dL


 


Creatinine  0.9  0.9  (0.8-1.5)  mg/dL


 


Glucose  94  80  ()  mg/dL


 


Calcium  9.8  9.0  (8.4-10.2)  mg/dL








                                  Adrenal panel











  08/08/19 08/09/19 Range/Units





  19:21 04:43 


 


Sodium  136 L  140  (137-145)  mmol/L


 


Potassium  4.0  4.2  (3.6-5.0)  mmol/L


 


Chloride  97.7 L  102.6  ()  mmol/L


 


Carbon Dioxide  28  26  (22-30)  mmol/L


 


BUN  7 L  8 L  (9-20)  mg/dL


 


Creatinine  0.9  0.9  (0.8-1.5)  mg/dL


 


Glucose  94  80  ()  mg/dL


 


Calcium  9.8  9.0  (8.4-10.2)  mg/dL


 


Total Bilirubin  0.70   (0.1-1.2)  mg/dL


 


AST  19   (5-40)  units/L


 


ALT  15   (7-56)  units/L


 


Alkaline Phosphatase  49   ()  units/L


 


Total Protein  7.6   (6.3-8.2)  g/dL


 


Albumin  4.7   (3.9-5)  g/dL

## 2019-08-09 NOTE — GASTROENTEROLOGY CONSULTATION
History of Present Illness





- Reason for Consult


Consult date: 08/09/19


BPR/diverticulitis


Requesting physician: GUILLE SHAFER





- History of Present Illness


Patient is a 28 y/o male with PMH of hypothyroidism who presented to ED with c/o

LLQ abdominal pain that had been intermittent x ~6 months but progressively 

worsened yesterday with associated fever, N/V, and constipation with blood in 

stool. Upon admission, he was found to have acute diverticulitis to which GI has

been consulted. Surgery following. This morning patient was resting in bed w/o 

acute distress. Reports feeling better with abd pain and N/V improving. Reports 

BM yesterday with small amount of stool which was green in color with a scant 

amount of bright red blood on TP with wiping. No hematemesis, melena, or further

signs of bleeding this am. Admits to some recent wt loss over the last couple of

weeks associated with current symptoms. Denies CP, SOB, dysphagia, or diarrhea. 

No hx of GI bleeding or IBD. No previous colonoscopy. States he was told by his 

mother that there is a family hx of colon CA, but he is unsure of who (unaware 

of any immediate family members with CA).








Past History


Past Medical History: hypothyroidism


Past Surgical History: No surgical history


Social history: smoking, other ([place])


Family history: diabetes, hypertension





Medications and Allergies


                                    Allergies











Allergy/AdvReac Type Severity Reaction Status Date / Time


 


No Known Allergies Allergy   Unverified 08/08/19 18:45











                                Home Medications











 Medication  Instructions  Recorded  Confirmed  Last Taken  Type


 


Levothyroxine [Synthroid] 75 mcg PO QAM 08/09/19 08/09/19 Unknown History











Active Meds: 


Active Medications





Acetaminophen (Tylenol)  650 mg PO Q4H PRN


   PRN Reason: Pain MILD(1-3)/Fever >100.5/HA


Sodium Chloride (Nacl 0.9% 1000 Ml)  1,000 mls @ 150 mls/hr IV AS DIRECT YANIRA


   Last Admin: 08/09/19 02:29 Dose:  150 mls/hr


   Documented by: 


Piperacillin Sod/Tazobactam Sod (Zosyn/Ns 4.5gm/100ml)  4.5 gm in 100 mls @ 200 

mls/hr IV Q6HR YANIRA; Protocol


   Last Admin: 08/09/19 06:49 Dose:  200 mls/hr


   Documented by: 


Morphine Sulfate (Morphine)  2 mg IV Q4H PRN


   PRN Reason: Pain, Moderate (4-6)


   Last Admin: 08/09/19 02:17 Dose:  2 mg


   Documented by: 


Ondansetron HCl (Zofran)  4 mg IV Q4H PRN


   PRN Reason: Nausea And Vomiting


   Last Admin: 08/09/19 02:19 Dose:  4 mg


   Documented by: 


Sodium Chloride (Sodium Chloride Flush Syringe 10 Ml)  10 ml IV BID YANIRA


Sodium Chloride (Sodium Chloride Flush Syringe 10 Ml)  10 ml IV PRN PRN


   PRN Reason: LINE FLUSH





medications reviewed/updated as required





Review of Systems





- Review of Systems


All systems: negative


Gastrointestinal: abdominal pain, nausea, BRBPR





Exam





- Constitutional


Vital Signs: 


                                        











Temp Pulse Resp BP Pulse Ox


 


 97.9 F   71   18   131/80   98 


 


 08/09/19 07:28  08/09/19 07:28  08/09/19 07:28  08/09/19 07:28  08/09/19 07:36











General appearance: no acute distress





- Respiratory


Respiratory effort: normal


Respiratory: bilateral: CTA





- Cardiovascular


Rhythm: regular





- Gastrointestinal


General gastrointestinal: Present: soft, tender (slight TTP in LLQ), normal 

bowel sounds





- Neurologic


Neurological: alert and oriented x3





- Labs


CBC & Chem 7: 


                                 08/09/19 04:43





                                 08/09/19 04:43


Lab Results: 


                         Laboratory Results - last 24 hr











  08/08/19 08/08/19 08/08/19





  19:21 19:21 20:35


 


WBC  18.6 H  


 


RBC  4.52  


 


Hgb  15.4 H  


 


Hct  44.6  


 


MCV  99 H  


 


MCH  34 H  


 


MCHC  34  


 


RDW  14.5  


 


Plt Count  252  


 


Lymph % (Auto)  7.9 L  


 


Mono % (Auto)  9.8 H  


 


Eos % (Auto)  0.9  


 


Baso % (Auto)  0.5  


 


Lymph #  1.5  


 


Mono #  1.8 H  


 


Eos #  0.2  


 


Baso #  0.1  


 


Seg Neutrophils %  80.9 H  


 


Seg Neutrophils #  15.0 H  


 


Sodium   136 L 


 


Potassium   4.0 


 


Chloride   97.7 L 


 


Carbon Dioxide   28 


 


Anion Gap   14 


 


BUN   7 L 


 


Creatinine   0.9 


 


Estimated GFR   > 60 


 


BUN/Creatinine Ratio   8 


 


Glucose   94 


 


Calcium   9.8 


 


Total Bilirubin   0.70 


 


AST   19 


 


ALT   15 


 


Alkaline Phosphatase   49 


 


Total Protein   7.6 


 


Albumin   4.7 


 


Albumin/Globulin Ratio   1.6 


 


Urine Color    Yellow


 


Urine Turbidity    Clear


 


Urine pH    6.0


 


Ur Specific Gravity    1.025


 


Urine Protein    <15 mg/dl


 


Urine Glucose (UA)    Neg


 


Urine Ketones    Tr


 


Urine Blood    Neg


 


Urine Nitrite    Neg


 


Urine Bilirubin    Neg


 


Urine Urobilinogen    4.0


 


Ur Leukocyte Esterase    Neg


 


Urine WBC (Auto)    1.0


 


Urine RBC (Auto)    4.0


 


Urine Mucus    Few














  08/09/19 08/09/19





  04:43 04:43


 


WBC  10.6 


 


RBC  4.20 


 


Hgb  14.3 


 


Hct  42.0 


 


MCV  100 H 


 


MCH  34 H 


 


MCHC  34 


 


RDW  14.5 


 


Plt Count  234 


 


Lymph % (Auto)  18.7 


 


Mono % (Auto)  14.2 H 


 


Eos % (Auto)  3.4 


 


Baso % (Auto)  0.3 


 


Lymph #  2.0 


 


Mono #  1.5 H 


 


Eos #  0.4 


 


Baso #  0.0 


 


Seg Neutrophils %  63.4 


 


Seg Neutrophils #  6.8 


 


Sodium   140


 


Potassium   4.2


 


Chloride   102.6


 


Carbon Dioxide   26


 


Anion Gap   16


 


BUN   8 L


 


Creatinine   0.9


 


Estimated GFR   > 60


 


BUN/Creatinine Ratio   9


 


Glucose   80


 


Calcium   9.0


 


Total Bilirubin  


 


AST  


 


ALT  


 


Alkaline Phosphatase  


 


Total Protein  


 


Albumin  


 


Albumin/Globulin Ratio  


 


Urine Color  


 


Urine Turbidity  


 


Urine pH  


 


Ur Specific Gravity  


 


Urine Protein  


 


Urine Glucose (UA)  


 


Urine Ketones  


 


Urine Blood  


 


Urine Nitrite  


 


Urine Bilirubin  


 


Urine Urobilinogen  


 


Ur Leukocyte Esterase  


 


Urine WBC (Auto)  


 


Urine RBC (Auto)  


 


Urine Mucus  














Assessment and Plan


1.acute diverticulitis





-afebrile


-WBC 10.6-trending down


-H/H WNL (14.3/42.0)- no active signs of bleeding


-abd CT revealed sigmoid diverticulitis with a small sigmoid intramural abscess 

(not drainable)


-clinically, patient is stable. Reports feeling better with abd pain and N/V 

improving. 


-Surgery and ID following


-continue antibiotics per ID recommendations and supportive care


-recommend patient f/u in clinic upon discharge to schedule outpatient 

colonoscopy in ~4-6 weeks after resolution of acute process (r/o IBD, neoplasm, 

etc.; plan discussed with patient with understanding voiced)


-further management per surgery


-will sign off, please call if needed

## 2019-08-10 LAB
BASOPHILS # (AUTO): 0 K/MM3 (ref 0–0.1)
BASOPHILS NFR BLD AUTO: 0.4 % (ref 0–1.8)
EOSINOPHIL # BLD AUTO: 0.4 K/MM3 (ref 0–0.4)
EOSINOPHIL NFR BLD AUTO: 5.5 % (ref 0–4.3)
HCT VFR BLD CALC: 39.8 % (ref 35.5–45.6)
HGB BLD-MCNC: 13.3 GM/DL (ref 11.8–15.2)
LYMPHOCYTES # BLD AUTO: 1.2 K/MM3 (ref 1.2–5.4)
LYMPHOCYTES NFR BLD AUTO: 15.7 % (ref 13.4–35)
MCHC RBC AUTO-ENTMCNC: 33 % (ref 32–34)
MCV RBC AUTO: 101 FL (ref 84–94)
MONOCYTES # (AUTO): 1 K/MM3 (ref 0–0.8)
MONOCYTES % (AUTO): 13.1 % (ref 0–7.3)
PLATELET # BLD: 216 K/MM3 (ref 140–440)
RBC # BLD AUTO: 3.95 M/MM3 (ref 3.65–5.03)

## 2019-08-10 RX ADMIN — PIPERACILLIN AND TAZOBACTAM SCH MLS/HR: 4; .5 INJECTION, POWDER, FOR SOLUTION INTRAVENOUS at 11:36

## 2019-08-10 RX ADMIN — Medication SCH ML: at 22:00

## 2019-08-10 RX ADMIN — Medication SCH ML: at 00:59

## 2019-08-10 RX ADMIN — SODIUM CHLORIDE SCH MLS/HR: 0.9 INJECTION, SOLUTION INTRAVENOUS at 10:41

## 2019-08-10 RX ADMIN — MORPHINE SULFATE PRN MG: 2 INJECTION, SOLUTION INTRAMUSCULAR; INTRAVENOUS at 13:20

## 2019-08-10 RX ADMIN — PIPERACILLIN AND TAZOBACTAM SCH MLS/HR: 4; .5 INJECTION, POWDER, FOR SOLUTION INTRAVENOUS at 00:58

## 2019-08-10 RX ADMIN — Medication SCH ML: at 10:42

## 2019-08-10 RX ADMIN — PIPERACILLIN AND TAZOBACTAM SCH MLS/HR: 4; .5 INJECTION, POWDER, FOR SOLUTION INTRAVENOUS at 05:19

## 2019-08-10 RX ADMIN — PIPERACILLIN AND TAZOBACTAM SCH MLS/HR: 4; .5 INJECTION, POWDER, FOR SOLUTION INTRAVENOUS at 18:04

## 2019-08-10 NOTE — PROGRESS NOTE
Assessment and Plan


Assessment and plan: 


29-year-old male with a history of hypothyroidism because emergency room with 

complaints of abdominal pain which has been ongoing over the last 4-5 months.  

Pain is in the left lower quadrant, sharp, sometimes dull, intensity 7/10, 

radiating to the scrotum, relieved with pain medication gives a emergency room. 

He is at nausea vomiting, none today.  He is also noted blood in his stool on 

several occasion with the abdominal pain no NSAID use








Acute Diverticulitis


BRPR secondary to above


Hypothyrodisim


SIRS without organ dysfunction secondary to diverticulitis





Plan


Supportive care


Continue abx


continue IVF


Ok for clear liquid today. Discussed with Surgery and also with the patient 

about plans of treatment


PAIN CONTROL


SURGERY, ID, GI input noted


DVT/GI PROPHY


Discussed with GI and outpatient colonoscopy in 6 weeks to r/o mass or IBD.  





History


Interval history: 


Patient seen and examined today, report abdominal pain is improving. [facility] Physical





- Constitutional


Vitals: 


                                        











Temp Pulse Resp BP Pulse Ox


 


 97.9 F   72   18   143/79   98 


 


 08/10/19 12:00  08/10/19 12:00  08/10/19 12:00  08/10/19 12:00  08/10/19 11:04











General appearance: Present: no acute distress, well-nourished





- EENT


Eyes: Present: PERRL


ENT: hearing intact, clear oral mucosa, dentition normal





- Neck


Neck: Present: supple, normal ROM





- Respiratory


Respiratory effort: normal


Respiratory: bilateral: CTA





- Cardiovascular


Rhythm: regular


Heart Sounds: Present: S1 & S2.  Absent: systolic murmur, diastolic murmur





- Extremities


Extremities: no ischemia, pulses intact, pulses symmetrical, No edema, normal 

temperature, normal color, Full ROM


Extremity abnormal: edema


Peripheral Pulses: within normal limits





- Abdominal


General gastrointestinal: soft, non-tender, non-distended, normal bowel sounds





- Integumentary


Integumentary: Present: clear, warm, dry





- Psychiatric


Psychiatric: appropriate mood/affect, intact judgment & insight, memory intact





- Neurologic


Neurologic: CNII-XII intact, moves all extremities





- Allied Health


Allied health notes reviewed: nursing





Results





- Labs


CBC & Chem 7: 


                                 08/10/19 05:10





                                 08/09/19 04:43


Labs: 


                             Laboratory Last Values











WBC  7.7 K/mm3 (4.5-11.0)   08/10/19  05:10    


 


RBC  3.95 M/mm3 (3.65-5.03)   08/10/19  05:10    


 


Hgb  13.3 gm/dl (11.8-15.2)   08/10/19  05:10    


 


Hct  39.8 % (35.5-45.6)   08/10/19  05:10    


 


MCV  101 fl (84-94)  H  08/10/19  05:10    


 


MCH  34 pg (28-32)  H  08/10/19  05:10    


 


MCHC  33 % (32-34)   08/10/19  05:10    


 


RDW  14.1 % (13.2-15.2)   08/10/19  05:10    


 


Plt Count  216 K/mm3 (140-440)   08/10/19  05:10    


 


Lymph % (Auto)  15.7 % (13.4-35.0)   08/10/19  05:10    


 


Mono % (Auto)  13.1 % (0.0-7.3)  H  08/10/19  05:10    


 


Eos % (Auto)  5.5 % (0.0-4.3)  H  08/10/19  05:10    


 


Baso % (Auto)  0.4 % (0.0-1.8)   08/10/19  05:10    


 


Lymph #  1.2 K/mm3 (1.2-5.4)   08/10/19  05:10    


 


Mono #  1.0 K/mm3 (0.0-0.8)  H  08/10/19  05:10    


 


Eos #  0.4 K/mm3 (0.0-0.4)   08/10/19  05:10    


 


Baso #  0.0 K/mm3 (0.0-0.1)   08/10/19  05:10    


 


Seg Neutrophils %  65.3 % (40.0-70.0)   08/10/19  05:10    


 


Seg Neutrophils #  5.0 K/mm3 (1.8-7.7)   08/10/19  05:10    


 


Sodium  140 mmol/L (137-145)   08/09/19  04:43    


 


Potassium  4.2 mmol/L (3.6-5.0)   08/09/19  04:43    


 


Chloride  102.6 mmol/L ()   08/09/19  04:43    


 


Carbon Dioxide  26 mmol/L (22-30)   08/09/19  04:43    


 


  16 mmol/L  08/09/19  04:43    


 


BUN  8 mg/dL (9-20)  L  08/09/19  04:43    


 


  0.9 mg/dL (0.8-1.5)   08/09/19  04:43    


 


Estimated GFR  > 60 ml/min  08/09/19  04:43    


 


  9 %  08/09/19  04:43    


 


Glucose  80 mg/dL ()   08/09/19  04:43    


 


Calcium  9.0 mg/dL (8.4-10.2)   08/09/19  04:43    


 


  0.70 mg/dL (0.1-1.2)   08/08/19  19:21    


 


AST  19 units/L (5-40)   08/08/19  19:21    


 


ALT  15 units/L (7-56)   08/08/19  19:21    


 


  49 units/L ()   08/08/19  19:21    


 


  7.6 g/dL (6.3-8.2)   08/08/19  19:21    


 


  4.7 g/dL (3.9-5)   08/08/19  19:21    


 


  1.6 %  08/08/19  19:21    


 


  Yellow  (Yellow)   08/08/19  20:35    


 


  Clear  (Clear)   08/08/19  20:35    


 


  6.0  (5.0-7.0)   08/08/19  20:35    


 


Ur Specific Gravity  1.025  (1.003-1.030)   08/08/19  20:35    


 


  <15 mg/dl mg/dL (Negative)   08/08/19  20:35    


 


  Neg mg/dL (Negative)   08/08/19  20:35    


 


  Tr mg/dL (Negative)   08/08/19  20:35    


 


  Neg  (Negative)   08/08/19  20:35    


 


  Neg  (Negative)   08/08/19  20:35    


 


  Neg  (Negative)   08/08/19  20:35    


 


  4.0 mg/dL (<2.0)   08/08/19  20:35    


 


Ur Leukocyte Esterase  Neg  (Negative)   08/08/19  20:35    


 


  1.0 /HPF (0.0-6.0)   08/08/19  20:35    


 


  4.0 /HPF (0.0-6.0)   08/08/19  20:35    


 


  Few /HPF  08/08/19  20:35    














Active Medications





- Current Medications


Current Medications: 














Generic Name Dose Route Start Last Admin





  Trade Name Freq  PRN Reason Stop Dose Admin


 


Acetaminophen  650 mg  08/08/19 23:36 





  Tylenol  PO  





  Q4H PRN  





  Pain MILD(1-3)/Fever >100.5/HA  


 


Sodium Chloride  1,000 mls @ 150 mls/hr  08/08/19 23:45  08/10/19 10:41





  Nacl 0.9% 1000 Ml  IV   150 mls/hr





  AS DIRECT YANIRA   Administration


 


Piperacillin Sod/Tazobactam Sod  4.5 gm in 100 mls @ 200 mls/hr  08/09/19 06:00 

08/10/19 11:36





  Zosyn/Ns 4.5gm/100ml  IV   200 mls/hr





  Q6HR YANIRA   Administration





  Protocol  


 


Morphine Sulfate  2 mg  08/08/19 23:36  08/10/19 13:20





  Morphine  IV   2 mg





  Q4H PRN   Administration





  Pain, Moderate (4-6)  


 


Ondansetron HCl  4 mg  08/08/19 23:36  08/09/19 02:19





  Zofran  IV   4 mg





  Q4H PRN   Administration





  Nausea And Vomiting  


 


Sodium Chloride  10 ml  08/09/19 10:00  08/10/19 10:42





  Sodium Chloride Flush Syringe 10 Ml  IV   10 ml





  BID YANIRA   Administration


 


Sodium Chloride  10 ml  08/08/19 23:36 





  Sodium Chloride Flush Syringe 10 Ml  IV  





  PRN PRN  





  LINE FLUSH

## 2019-08-10 NOTE — DISCHARGE SUMMARY
Providers





- Providers


Date of Admission: 


08/08/19 23:37





Attending physician: 


BARTOLOME CARLSON MD





                                        





08/08/19 23:08


Consult to Physician [CONS] Routine 


   Comment: DR SUSHANT BLACKWOOD W/DR BALL @[phone]


   Consulting Provider: MAURO BALL


   Physician Instructions: 


   Reason For Exam: diverticulitis with abscess





08/08/19 23:36


Consult to Physician [CONS] Routine 


   Comment: 


   Consulting Provider: LANNY GALEANO


   Physician Instructions: 


   Reason For Exam: bpr





08/09/19 10:09


Consult to Physician [CONS] Routine 


   Comment: 


   Consulting Provider: RACHELLE DENISE


   Physician Instructions: 


   Reason For Exam: diverticulitis.  FH colon CA





08/09/19 10:10


Consult to Physician [CONS] Routine 


   Comment: 


   Consulting Provider: OLIVIA CARSON


   Physician Instructions: 


   Reason For Exam: diverticulitis.  possible early abscess formation











Primary care physician: 


[facility], [place]








Hospitalization


Condition: Stable


Disposition: DC-01 TO HOME OR SELFCARE





Core Measure Documentation





- Palliative Care


Palliative Care/ Comfort Measures: Not Applicable





Exam





- Constitutional


Vitals: 


                                        











Temp Pulse Resp BP Pulse Ox


 


 97.6 F   75   20   106/83   98 


 


 08/10/19 08:00  08/10/19 08:00  08/10/19 08:00  08/10/19 08:00  08/10/19 08:00














Plan


Activity: advance as tolerated, fall precautions


Diet: low fat


Wound: per your surgeon's advice


[facility]: 


[facility]


LANNY GALEANO MD [Staff Physician] - 7 Days


MAURO BALL MD [Staff Physician] - 7 Days


Prescriptions: 


Ciprofloxacin HCl [Ciprofloxacin TAB] 500 mg PO Q12HR #28 tab


metroNIDAZOLE [Flagyl TAB] 500 mg PO Q12HR #28 tab

## 2019-08-10 NOTE — PROGRESS NOTE
Assessment and Plan





Pt feeling well.  without compl.  no further pain





Abd soft,  non tender





ID eval noted.


GI to follow at out pt





attempt cl liq diet this am





may advance to full liq in am and possibly d/c if diet jabari





RTO wed





                                Selected Entries











  08/10/19





  08:00


 


Temperature 97.6 F


 


Pulse Rate 75


 


Respiratory 20





Rate 


 


Blood Pressure 106/83





[Left] 








                                Laboratory Tests











  08/09/19 08/10/19





  04:43 05:10


 


WBC  10.6  7.7


 


Hgb   13.3


 


Hct   39.8














Objective


                               Vital Signs - 12hr











  08/10/19 08/10/19 08/10/19





  00:05 04:25 07:22


 


Temperature 97.7 F 97.6 F 97.9 F


 


Pulse Rate 61 97 H 74


 


Respiratory 20 20 20





Rate   


 


Blood Pressure 117/76  106/82


 


Blood Pressure  125/84 





[Left]   


 


O2 Sat by Pulse 97 97 98





Oximetry   














  08/10/19





  08:00


 


Temperature 97.6 F


 


Pulse Rate 75


 


Respiratory 20





Rate 


 


Blood Pressure 


 


Blood Pressure 106/83





[Left] 


 


O2 Sat by Pulse 98





Oximetry 














- Labs





                                 08/10/19 05:10





                                 08/09/19 04:43

## 2019-08-11 VITALS — SYSTOLIC BLOOD PRESSURE: 125 MMHG | DIASTOLIC BLOOD PRESSURE: 84 MMHG

## 2019-08-11 RX ADMIN — PIPERACILLIN AND TAZOBACTAM SCH MLS/HR: 4; .5 INJECTION, POWDER, FOR SOLUTION INTRAVENOUS at 00:00

## 2019-08-11 RX ADMIN — MORPHINE SULFATE PRN MG: 2 INJECTION, SOLUTION INTRAMUSCULAR; INTRAVENOUS at 09:52

## 2019-08-11 RX ADMIN — PIPERACILLIN AND TAZOBACTAM SCH MLS/HR: 4; .5 INJECTION, POWDER, FOR SOLUTION INTRAVENOUS at 06:35

## 2019-08-11 NOTE — PROGRESS NOTE
Assessment and Plan


Cultures: none





A/P:


30 yo M PMHx hypothyroidism admitted with diverticulitis





1. Diverticulitis -  clinically improving, tolerating PO, leukocytosis resolved.

On zosyn.





2. Hypothyroidism





Recs:


- continue pip-tazo while inpatient.


- ok to discharge from ID standpoint on ciprofloxacin  mg q12h and 

metronidazole 500mg q8h until 8/22/2019


- follow up with Dr Stafford on 8/21. Sent to . 





Discussed with Dr Malini Aburto MD


[facility] ([facility])


Mobile [phone]


Office [phone]





Subjective


Date of service: 08/11/19


Principal diagnosis: diverticulitis


Interval history: 


Patient feels better, he is able to tolerate clear liquids, wants to eat grits. 

No fever. No N/V/D or abdominal pain.  





Objective





- Exam


Narrative Exam: 


Constitutional: awake, no distress, following commands


Head, Ears, Nose: Normocephalic, atraumatic. External ears, nose normal


Eyes: Conjunctivae/corneas clear. No icterus. No ptosis.


Neck: Supple, no meningeal signs


Oral: fair dentition, moist mucous membranes


Cardiovascular: S1, S2 normal. Normal rhythm


Respiratory: Good air entry, clear to auscultation bilaterally


GI: Soft, non-tender; bowel sounds normal. No peritoneal signs


Musculoskeletal: No pedal edema, 


Skin: No rash or abscess


Hem/Lymphatic: No palpable cervical or supraclavicular nodes. No lymphangitis


Psych: no agitation


Neurological: Moves all extremities, no focal defects


 








- Constitutional


Vitals: 


                                   Vital Signs











Temp Pulse Resp BP Pulse Ox


 


 97.6 F   49 L  16   125/84   100 


 


 08/11/19 07:21  08/11/19 07:21  08/11/19 07:21  08/11/19 07:21  08/11/19 07:21








                           Temperature -Last 24 Hours











Temperature                    97.6 F


 


Temperature                    97.8 F


 


Temperature                    97.9 F


 


Temperature                    98.1 F


 


Temperature                    98.4 F


 


Temperature                    98.0 F

















- Labs


CBC & Chem 7: 


                                 08/10/19 05:10





                                 08/09/19 04:43

## 2019-08-11 NOTE — PROGRESS NOTE
Assessment and Plan





Pt feeling well without compl.  jabari full liq diet





Abd soft,  non tender.





surgically stable





may d/c today from surg perspective





rto wed





                                Selected Entries











  08/11/19





  07:21


 


Temperature 97.6 F


 


Pulse Rate 49 L


 


Respiratory 16





Rate 


 


Blood Pressure 125/84








                                Laboratory Tests











  08/10/19





  05:10


 


WBC  7.7














Objective


                               Vital Signs - 12hr











  08/11/19 08/11/19





  04:00 07:21


 


Temperature 97.8 F 97.6 F


 


Pulse Rate 52 L 49 L


 


Respiratory 20 16





Rate  


 


Blood Pressure 128/85 125/84


 


O2 Sat by Pulse 99 100





Oximetry  














- Labs





                                 08/10/19 05:10





                                 08/09/19 04:43

## 2019-08-11 NOTE — DISCHARGE SUMMARY
Providers





- Providers


Date of Admission: 


08/08/19 23:37





Attending physician: 


BARTOLOME CARLSON MD





                                        





08/08/19 23:08


Consult to Physician [CONS] Routine 


   Comment: DR SUSHANT BLACKWOOD W/DR BALL @[phone]


   Consulting Provider: MAURO BALL


   Physician Instructions: 


   Reason For Exam: diverticulitis with abscess





08/08/19 23:36


Consult to Physician [CONS] Routine 


   Comment: 


   Consulting Provider: LANNY GALEANO


   Physician Instructions: 


   Reason For Exam: bpr





08/09/19 10:09


Consult to Physician [CONS] Routine 


   Comment: 


   Consulting Provider: RACHELLE DENISE


   Physician Instructions: 


   Reason For Exam: diverticulitis.  FH colon CA





08/09/19 10:10


Consult to Physician [CONS] Routine 


   Comment: 


   Consulting Provider: OLIVIA CARSON


   Physician Instructions: 


   Reason For Exam: diverticulitis.  possible early abscess formation











Primary care physician: 


[facility], [place]








Hospitalization


Reason for admission: abdominal pain


Condition: Stable


Hospital course: 


29-year-old male with a history of hypothyroidism because emergency room with 

complaints of abdominal pain which has been ongoing over the last 4-5 months.  

Pain is in the left lower quadrant, sharp, sometimes dull, intensity 7/10, 

radiating to the scrotum, relieved with pain medication gives a emergency room. 

 He is at nausea vomiting, none today.  He is also noted blood in his stool on 

several occasion with the abdominal pain no NSAID use.  Patient was treated with

 bowel rest was followed by surgery ID and gastroenterologist recommendation to 

gradually decrease in feeding clear liquids and full liquids with the patient 

tolerated and can progress to GI soft in the a.m. with carbonated drinks this 

with the splint and the patient verbalized understanding.  We'll also discuss w

ith the patient the need to follow up with GI outpatient for colonoscopy in 6 

weeks to rule out mass or IBD.  The patient verbalized understanding and is 

clinically stable at this time for discharge








Acute Diverticulitis


BRPR secondary to above


Hypothyrodisim


SIRS without organ dysfunction secondary to diverticulitis








Disposition: DC-01 TO HOME OR SELFCARE


Time spent for discharge: 35 MINS





Core Measure Documentation





- Palliative Care


Palliative Care/ Comfort Measures: Not Applicable





- Core Measures


Any of the following diagnoses?: none





Exam





- Physical Exam


Narrative exam: 


VITAL SIGNS:  Reviewed.    


GENERAL:  The patient appears normally developed, Vital signs as documented.


HEAD:  No signs of head trauma.


EYES:  Pupils are equal.  Extraocular motions intact.  


EARS:  Hearing grossly intact.


MOUTH:  Oropharynx is normal. 


NECK:  No adenopathy, no JVD.  


CHEST:  Chest with clear breath sounds bilaterally.  No wheezes, rales, or 

rhonchi.  


CARDIAC:  Regular rate and rhythm.  S1 and S2, without murmurs, gallops, or 

rubs.


VASCULAR:  No Edema.  Peripheral pulses normal and equal in all extremities.


ABDOMEN:  Soft, non tender and non distended.  No   rebound or guarding, and no 

masses palpated.   Bowel Sounds normal.


MUSCULOSKELETAL:  Good range of motion of all major joints. Extremities without 

clubbing, cyanosis or edema.  


NEUROLOGIC EXAM:  Alert and oriented x 3   No focal sensory or strength 

deficits.   Speech normal.  Follows commands.


PSYCHIATRIC:  Mood normal.


SKIN: Visible skin tattoos.








- Constitutional


Vitals: 


                                        











Temp Pulse Resp BP Pulse Ox


 


 97.6 F   49 L  16   125/84   100 


 


 08/11/19 07:21  08/11/19 07:21  08/11/19 07:21  08/11/19 07:21  08/11/19 07:21














Plan


Activity: advance as tolerated, fall precautions


Diet: other (full liquid diet today and can advance to GI soft in the a.m. and 

then regular from thereon.  High fiber diet)


Follow up with: 


MAURO BALL MD [Staff Physician] - 7 Days


LANNY GALEANO MD [Staff Physician] - 7 Days


[facility], MD [Primary Care Provider] - 7 Days


Prescriptions: 


Ciprofloxacin HCl [Ciprofloxacin TAB] 500 mg PO Q12HR #28 tab


metroNIDAZOLE [Flagyl] 500 mg PO Q8HR #42 tablet


traMADol [Ultram] 50 mg PO Q6HR PRN #14 tablet


 PRN Reason: Pain


Ondansetron [Zofran Odt] 4 mg PO Q6H #30 tab.justina

## 2019-08-22 ENCOUNTER — HOSPITAL ENCOUNTER (INPATIENT)
Dept: HOSPITAL 5 - ED | Age: 29
LOS: 5 days | Discharge: HOME | DRG: 379 | End: 2019-08-27
Attending: INTERNAL MEDICINE | Admitting: HOSPITALIST
Payer: COMMERCIAL

## 2019-08-22 DIAGNOSIS — Z82.49: ICD-10-CM

## 2019-08-22 DIAGNOSIS — F17.210: ICD-10-CM

## 2019-08-22 DIAGNOSIS — Z79.899: ICD-10-CM

## 2019-08-22 DIAGNOSIS — Z83.3: ICD-10-CM

## 2019-08-22 DIAGNOSIS — Z80.8: ICD-10-CM

## 2019-08-22 DIAGNOSIS — E03.9: ICD-10-CM

## 2019-08-22 DIAGNOSIS — F12.90: ICD-10-CM

## 2019-08-22 DIAGNOSIS — K57.21: Primary | ICD-10-CM

## 2019-08-22 LAB
ALBUMIN SERPL-MCNC: 3.9 G/DL (ref 3.9–5)
ALT SERPL-CCNC: 18 UNITS/L (ref 7–56)
BASOPHILS # (AUTO): 0.1 K/MM3 (ref 0–0.1)
BASOPHILS NFR BLD AUTO: 0.5 % (ref 0–1.8)
BILIRUB UR QL STRIP: (no result)
BLOOD UR QL VISUAL: (no result)
BUN SERPL-MCNC: 7 MG/DL (ref 9–20)
BUN/CREAT SERPL: 8 %
CALCIUM SERPL-MCNC: 9.1 MG/DL (ref 8.4–10.2)
EOSINOPHIL # BLD AUTO: 0.3 K/MM3 (ref 0–0.4)
EOSINOPHIL NFR BLD AUTO: 2.3 % (ref 0–4.3)
HCT VFR BLD CALC: 42 % (ref 35.5–45.6)
HEMOLYSIS INDEX: 3
HGB BLD-MCNC: 14.2 GM/DL (ref 11.8–15.2)
LYMPHOCYTES # BLD AUTO: 1.4 K/MM3 (ref 1.2–5.4)
LYMPHOCYTES NFR BLD AUTO: 10.4 % (ref 13.4–35)
MCHC RBC AUTO-ENTMCNC: 34 % (ref 32–34)
MCV RBC AUTO: 99 FL (ref 84–94)
MONOCYTES # (AUTO): 1.6 K/MM3 (ref 0–0.8)
MONOCYTES % (AUTO): 11.3 % (ref 0–7.3)
PH UR STRIP: 5 [PH] (ref 5–7)
PLATELET # BLD: 226 K/MM3 (ref 140–440)
PROT UR STRIP-MCNC: (no result) MG/DL
RBC # BLD AUTO: 4.23 M/MM3 (ref 3.65–5.03)
RBC #/AREA URNS HPF: 1 /HPF (ref 0–6)
UROBILINOGEN UR-MCNC: < 2 MG/DL (ref ?–2)
WBC #/AREA URNS HPF: 1 /HPF (ref 0–6)

## 2019-08-22 PROCEDURE — 81001 URINALYSIS AUTO W/SCOPE: CPT

## 2019-08-22 PROCEDURE — 99406 BEHAV CHNG SMOKING 3-10 MIN: CPT

## 2019-08-22 PROCEDURE — 85025 COMPLETE CBC W/AUTO DIFF WBC: CPT

## 2019-08-22 PROCEDURE — 80048 BASIC METABOLIC PNL TOTAL CA: CPT

## 2019-08-22 PROCEDURE — 74177 CT ABD & PELVIS W/CONTRAST: CPT

## 2019-08-22 PROCEDURE — 96361 HYDRATE IV INFUSION ADD-ON: CPT

## 2019-08-22 PROCEDURE — 96372 THER/PROPH/DIAG INJ SC/IM: CPT

## 2019-08-22 PROCEDURE — 96374 THER/PROPH/DIAG INJ IV PUSH: CPT

## 2019-08-22 PROCEDURE — 87116 MYCOBACTERIA CULTURE: CPT

## 2019-08-22 PROCEDURE — 84443 ASSAY THYROID STIM HORMONE: CPT

## 2019-08-22 PROCEDURE — 82140 ASSAY OF AMMONIA: CPT

## 2019-08-22 PROCEDURE — 36415 COLL VENOUS BLD VENIPUNCTURE: CPT

## 2019-08-22 PROCEDURE — 80053 COMPREHEN METABOLIC PANEL: CPT

## 2019-08-22 PROCEDURE — G0378 HOSPITAL OBSERVATION PER HR: HCPCS

## 2019-08-22 RX ADMIN — SODIUM CHLORIDE SCH MLS/HR: 0.9 INJECTION, SOLUTION INTRAVENOUS at 12:12

## 2019-08-22 RX ADMIN — METRONIDAZOLE SCH MLS/HR: 5 INJECTION, SOLUTION INTRAVENOUS at 14:42

## 2019-08-22 RX ADMIN — DOCUSATE SODIUM SCH MG: 100 CAPSULE, LIQUID FILLED ORAL at 21:37

## 2019-08-22 RX ADMIN — LEVOFLOXACIN SCH MLS/HR: 5 INJECTION, SOLUTION INTRAVENOUS at 12:24

## 2019-08-22 RX ADMIN — OXYCODONE AND ACETAMINOPHEN PRN TAB: 5; 325 TABLET ORAL at 14:41

## 2019-08-22 RX ADMIN — METRONIDAZOLE SCH MLS/HR: 5 INJECTION, SOLUTION INTRAVENOUS at 21:38

## 2019-08-22 NOTE — CAT SCAN REPORT
CT the abdomen and pelvis with intravenous contrast 



INDICATION / CLINICAL INFORMATION:

Abdominal and rectal pain.



TECHNIQUE:

The patient received 100 cc Omnipaque 300 intravenously. All CT scans at this location are performed 
using CT dose reduction for ALARA by means of automated exposure control. 



COMPARISON:

8/8/2019.



FINDINGS:

ABDOMEN: The liver, spleen, gallbladder, bile ducts, pancreas, adrenal glands, kidneys and bowel demo
nstrate no significant abnormality. No adenopathy is seen. The lung bases are clear.



PELVIS: Changes of proximal sigmoid diverticulitis are again identified. Small intramural abscess is 
again noted. Prominent inflammatory change in the pericolonic fat and sigmoid mesocolon has shown mil
d increase. I see no evidence of bowel obstruction, free air or drainable fluid collection. No new ab
normality is seen in the pelvis. No abnormality is seen in the anorectal region.



IMPRESSION: Changes of acute sigmoid diverticulitis are again identified and have shown mild progress
ion. No drainable fluid collection is seen. 



Signer Name: Girma Paniagua MD 

Signed: 8/22/2019 5:31 AM

 Workstation Name: [id number]

## 2019-08-22 NOTE — EMERGENCY DEPARTMENT REPORT
ED Male  HPI





- General


Chief complaint: Abdominal Pain


Stated complaint: UNABLE TO USE THE RESTROOM


Time Seen by Provider: 19 03:26


Source: patient, family


Mode of arrival: Ambulatory


Limitations: No Limitations





- History of Present Illness


Initial comments: 





29-year-old -American male with a past medical history of hypothyroidism 

and recent history of diverticulitis with abscess.  Patient was last admitted on

2019 and discharged on .  Patient was placed on Cipro and 

Zofran, Flagyl and tramadol.  Patient comes in today for complaint of rectal 

pain that started yesterday.  Patient also complains of pain and difficulty 

urinating.  Patient's last BM was yesterday.  Patient reports that he did not 

get the tramadol secondary to have a Lortabs from the dentist that he's been 

taken for pain management.  Patient denies any fever.


Onset/Timin


-: days(s)


Radiation: other (rectal pain)


Severity scale (0 -10): 5


Improves with: none


Worsens with: palpation





- Related Data


                                Home Medications











 Medication  Instructions  Recorded  Confirmed  Last Taken


 


Levothyroxine [Synthroid] 75 mcg PO QAM 19 Unknown








                                  Previous Rx's











 Medication  Instructions  Recorded  Last Taken  Type


 


Ciprofloxacin HCl [Ciprofloxacin 500 mg PO Q12HR #28 tab 08/10/19 Unknown Rx





TAB]    


 


Ondansetron [Zofran Odt] 4 mg PO Q6H #30 tab.rapdis 19 Unknown Rx


 


metroNIDAZOLE [Flagyl] 500 mg PO Q8HR #42 tablet 19 Unknown Rx


 


traMADol [Ultram] 50 mg PO Q6HR PRN #14 tablet 19 Unknown Rx











                                    Allergies











Allergy/AdvReac Type Severity Reaction Status Date / Time


 


No Known Allergies Allergy   Verified 19 02:39














ED Review of Systems


ROS: 


Stated complaint: UNABLE TO USE THE RESTROOM


Other details as noted in HPI





Comment: All other systems reviewed and negative





ED Past Medical Hx





- Past Medical History


Previous Medical History?: Yes


Additional medical history: Hypothyroidism





- Surgical History


Past Surgical History?: No





- Social History


Smoking Status: Current Every Day Smoker


Substance Use Type: Marijuana





- Medications


Home Medications: 


                                Home Medications











 Medication  Instructions  Recorded  Confirmed  Last Taken  Type


 


Levothyroxine [Synthroid] 75 mcg PO QAM 19 Unknown History


 


Ciprofloxacin HCl [Ciprofloxacin 500 mg PO Q12HR #28 tab 08/10/19  Unknown Rx





TAB]     


 


Ondansetron [Zofran Odt] 4 mg PO Q6H #30 tab.rapdis 19  Unknown Rx


 


metroNIDAZOLE [Flagyl] 500 mg PO Q8HR #42 tablet 19  Unknown Rx


 


traMADol [Ultram] 50 mg PO Q6HR PRN #14 tablet 19  Unknown Rx














ED Physical Exam





- General


Limitations: No Limitations





ED Course


                                   Vital Signs











  19





  02:41


 


Temperature 98.0 F


 


Pulse Rate 77


 


Respiratory 18





Rate 


 


Blood Pressure 137/87


 


O2 Sat by Pulse 99





Oximetry 











Critical care attestation.: 


If time is entered above; I have spent that time in minutes in the direct care 

of this critically ill patient, excluding procedure time.








ED Disposition


Condition: Stable


Referrals: 


[facility] [Primary Care Provider] - 3-5 Days

## 2019-08-22 NOTE — HISTORY AND PHYSICAL REPORT
History of Present Illness


Date of examination: 08/22/19


Date of admission: 


08/22/19 06:11





Chief complaint: 





abd pain


History of present illness: 





Patient is a 28 y/o male with PMH of hypothyroidism who was recently admitted 

earlier this month on 8/9/19 for acute diverticulitis after presenting to ED 

with abdominal pain.  Patient continues to have recurrent LLQ abdominal pain 

with repeat abd CT showing again sigmoid diverticulitis with mild progression 

and small intramural abscess (not able to drain).  The patient denies any fever 

or chills.  He reports being compliant with taking antibiotics at home 

(Cipro/flagyl). He states his abd pain improved upon discharge but has worsened 

over the past few days prompting his visit to the emergency room.  No nausea or 

vomiting. 





Past History


Past Medical History: hypothyroidism, other (diverticulitis)


Past Surgical History: No surgical history


Social history: smoking


Family history: no significant family history





Medications and Allergies


                                    Allergies











Allergy/AdvReac Type Severity Reaction Status Date / Time


 


No Known Allergies Allergy   Verified 08/22/19 02:39











                                Home Medications











 Medication  Instructions  Recorded  Confirmed  Last Taken  Type


 


Levothyroxine [Synthroid] 75 mcg PO QAM 08/09/19 08/09/19 Unknown History


 


Ciprofloxacin HCl [Ciprofloxacin 500 mg PO Q12HR #28 tab 08/10/19  Unknown Rx





TAB]     


 


metroNIDAZOLE [Flagyl] 500 mg PO Q8HR #42 tablet 08/11/19  Unknown Rx














Review of Systems


All systems: negative





Exam





- Constitutional


Vitals: 


                                        











Temp Pulse Resp BP Pulse Ox


 


 97.8 F   74   20   147/94   99 


 


 08/22/19 09:47  08/22/19 09:47  08/22/19 09:47  08/22/19 09:47  08/22/19 09:47











General appearance: Present: no acute distress, well-nourished





- EENT


Eyes: Present: PERRL


ENT: hearing intact, clear oral mucosa





- Neck


Neck: Present: supple, normal ROM





- Respiratory


Respiratory effort: normal


Respiratory: bilateral: CTA





- Cardiovascular


Heart Sounds: Present: S1 & S2.  Absent: rub, click





- Extremities


Extremities: pulses symmetrical, No edema


Peripheral Pulses: within normal limits





- Abdominal


General gastrointestinal: Present: soft, tender, non-distended, normal bowel 

sounds


Localized gastrointestinal: tender: LLQ (mild)


Male genitourinary: Present: normal





- Integumentary


Integumentary: Present: clear, warm, dry





- Musculoskeletal


Musculoskeletal: gait normal, strength equal bilaterally





- Psychiatric


Psychiatric: appropriate mood/affect, intact judgment & insight





- Neurologic


Neurologic: CNII-XII intact, moves all extremities





Results





- Labs


CBC & Chem 7: 


                                 08/22/19 03:19





                                 08/22/19 03:19


Labs: 


                             Laboratory Last Values











WBC  14.0 K/mm3 (4.5-11.0)  H  08/22/19  03:19    


 


RBC  4.23 M/mm3 (3.65-5.03)   08/22/19  03:19    


 


Hgb  14.2 gm/dl (11.8-15.2)   08/22/19  03:19    


 


Hct  42.0 % (35.5-45.6)   08/22/19  03:19    


 


MCV  99 fl (84-94)  H  08/22/19  03:19    


 


MCH  34 pg (28-32)  H  08/22/19  03:19    


 


MCHC  34 % (32-34)   08/22/19  03:19    


 


RDW  14.4 % (13.2-15.2)   08/22/19  03:19    


 


Plt Count  226 K/mm3 (140-440)   08/22/19  03:19    


 


Lymph % (Auto)  10.4 % (13.4-35.0)  L  08/22/19  03:19    


 


Mono % (Auto)  11.3 % (0.0-7.3)  H  08/22/19  03:19    


 


Eos % (Auto)  2.3 % (0.0-4.3)   08/22/19  03:19    


 


Baso % (Auto)  0.5 % (0.0-1.8)   08/22/19  03:19    


 


Lymph #  1.4 K/mm3 (1.2-5.4)   08/22/19  03:19    


 


Mono #  1.6 K/mm3 (0.0-0.8)  H  08/22/19  03:19    


 


Eos #  0.3 K/mm3 (0.0-0.4)   08/22/19  03:19    


 


Baso #  0.1 K/mm3 (0.0-0.1)   08/22/19  03:19    


 


Seg Neutrophils %  75.5 % (40.0-70.0)  H  08/22/19  03:19    


 


Seg Neutrophils #  10.5 K/mm3 (1.8-7.7)  H  08/22/19  03:19    


 


Sodium  137 mmol/L (137-145)   08/22/19  03:19    


 


Potassium  4.2 mmol/L (3.6-5.0)   08/22/19  03:19    


 


Chloride  97.8 mmol/L ()  L  08/22/19  03:19    


 


Carbon Dioxide  30 mmol/L (22-30)   08/22/19  03:19    


 


  13 mmol/L  08/22/19  03:19    


 


BUN  7 mg/dL (9-20)  L  08/22/19  03:19    


 


  0.9 mg/dL (0.8-1.5)   08/22/19  03:19    


 


Estimated GFR  > 60 ml/min  08/22/19  03:19    


 


  8 %  08/22/19  03:19    


 


Glucose  97 mg/dL ()   08/22/19  03:19    


 


Lactic Acid  0.60 mmol/L (0.7-2.0)  L  08/22/19  04:16    


 


Calcium  9.1 mg/dL (8.4-10.2)   08/22/19  03:19    


 


  0.60 mg/dL (0.1-1.2)   08/22/19  03:19    


 


AST  14 units/L (5-40)   08/22/19  03:19    


 


ALT  18 units/L (7-56)   08/22/19  03:19    


 


  39 units/L ()   08/22/19  03:19    


 


  6.2 g/dL (6.3-8.2)  L  08/22/19  03:19    


 


  3.9 g/dL (3.9-5)   08/22/19  03:19    


 


  1.7 %  08/22/19  03:19    


 


  Yellow  (Yellow)   08/22/19  04:22    


 


  Clear  (Clear)   08/22/19  04:22    


 


  5.0  (5.0-7.0)   08/22/19  04:22    


 


Ur Specific Gravity  1.016  (1.003-1.030)   08/22/19  04:22    


 


  <15 mg/dl mg/dL (Negative)   08/22/19  04:22    


 


  Neg mg/dL (Negative)   08/22/19  04:22    


 


  Neg mg/dL (Negative)   08/22/19  04:22    


 


  Neg  (Negative)   08/22/19  04:22    


 


  Neg  (Negative)   08/22/19  04:22    


 


  Neg  (Negative)   08/22/19  04:22    


 


  < 2.0 mg/dL (<2.0)   08/22/19  04:22    


 


Ur Leukocyte Esterase  Tr  (Negative)   08/22/19  04:22    


 


  1.0 /HPF (0.0-6.0)   08/22/19  04:22    


 


  1.0 /HPF (0.0-6.0)   08/22/19  04:22    














Assessment and Plan


Assessment and plan: 





Acute diverticulitis.  Continue with IV antibiotics and consider ID 

consultation.  GI consult.  Continue supportive care and pain control.





SIRS.  Etiology secondary to above.





Hypothyroidism.  Check TSH level

## 2019-08-22 NOTE — GASTROENTEROLOGY CONSULTATION
<MATEO SHARMA - Last Filed: 08/22/19 11:45>





History of Present Illness





- Reason for Consult


Consult date: 08/22/19


diverticulitis


Requesting physician: SEAN WANG





- History of Present Illness


Patient is a 28 y/o male with PMH of hypothyroidism who is previously known to 

our service from a recent consult earlier this month on 8/9/19 for acute 

diverticulitis who presented to ED with recurrent LLQ abdominal pain and repeat 

abd CT showing again sigmoid diverticulitis with mild progression and small 

intramural abscess (not able to drain). This morning patient was resting in bed 

w/o acute distress and family at bedside. He reports being compliant with taking

antibiotics at home (Cipro/flagyl). States his abd pain improved upon discharge 

on 8/11 but returned over the last couple of days. States he has had some recent

constipation after taking Lortabs given from dentist for jaw/mouth pain due to 

current infected tooth. Last BM was 2 days ago with non-bloody stool. Denies 

fever, CP, SOB, N/V, signs of bleeding, or diarrhea. Weight has been stable. No 

hx or Fhx of IBD. Has a Fhx of colon CA with uncles. No previous colonoscopy but

has an outpatient appoint already scheduled in our clinic to schedule procedure.








Past History


Past Medical History: hypothyroidism, other (diverticulitis)


Past Surgical History: No surgical history


Social history: smoking, other (marijuana)


Family history: diabetes, hypertension, other (colon CA (uncles))





Medications and Allergies


                                    Allergies











Allergy/AdvReac Type Severity Reaction Status Date / Time


 


No Known Allergies Allergy   Verified 08/22/19 02:39











                                Home Medications











 Medication  Instructions  Recorded  Confirmed  Last Taken  Type


 


Levothyroxine [Synthroid] 75 mcg PO QAM 08/09/19 08/09/19 Unknown History


 


Ciprofloxacin HCl [Ciprofloxacin 500 mg PO Q12HR #28 tab 08/10/19  Unknown Rx





TAB]     


 


metroNIDAZOLE [Flagyl] 500 mg PO Q8HR #42 tablet 08/11/19  Unknown Rx











Active Meds: 


medications reviewed/updated as required





Review of Systems





- Review of Systems


All systems: negative


Gastrointestinal: abdominal pain





Exam





- Constitutional


Vital Signs: 


                                        











Temp Pulse Resp BP Pulse Ox


 


 97.8 F   74   20   147/94   99 


 


 08/22/19 09:47  08/22/19 09:47  08/22/19 09:47  08/22/19 09:47  08/22/19 09:47











General appearance: no acute distress





- Respiratory


Respiratory effort: normal





- Cardiovascular


Rhythm: regular





- Gastrointestinal


General gastrointestinal: Present: soft, tender (slight TTP in LLQ), non-

distended, normal bowel sounds





- Neurologic


Neurological: alert and oriented x3





- Labs


CBC & Chem 7: 


                                 08/22/19 03:19





                                 08/22/19 03:19


Lab Results: 


                         Laboratory Results - last 24 hr











  08/22/19 08/22/19 08/22/19





  03:19 03:19 04:16


 


WBC  14.0 H  


 


RBC  4.23  


 


Hgb  14.2  


 


Hct  42.0  


 


MCV  99 H  


 


MCH  34 H  


 


MCHC  34  


 


RDW  14.4  


 


Plt Count  226  


 


Lymph % (Auto)  10.4 L  


 


Mono % (Auto)  11.3 H  


 


Eos % (Auto)  2.3  


 


Baso % (Auto)  0.5  


 


Lymph #  1.4  


 


Mono #  1.6 H  


 


Eos #  0.3  


 


Baso #  0.1  


 


Seg Neutrophils %  75.5 H  


 


Seg Neutrophils #  10.5 H  


 


Sodium   137 


 


Potassium   4.2 


 


Chloride   97.8 L 


 


Carbon Dioxide   30 


 


Anion Gap   13 


 


BUN   7 L 


 


Creatinine   0.9 


 


Estimated GFR   > 60 


 


BUN/Creatinine Ratio   8 


 


Glucose   97 


 


Lactic Acid    0.60 L


 


Calcium   9.1 


 


Total Bilirubin   0.60 


 


AST   14 


 


ALT   18 


 


Alkaline Phosphatase   39 


 


Total Protein   6.2 L 


 


Albumin   3.9 


 


Albumin/Globulin Ratio   1.7 


 


Urine Color   


 


Urine Turbidity   


 


Urine pH   


 


Ur Specific Gravity   


 


Urine Protein   


 


Urine Glucose (UA)   


 


Urine Ketones   


 


Urine Blood   


 


Urine Nitrite   


 


Urine Bilirubin   


 


Urine Urobilinogen   


 


Ur Leukocyte Esterase   


 


Urine WBC (Auto)   


 


Urine RBC (Auto)   














  08/22/19





  04:22


 


WBC 


 


RBC 


 


Hgb 


 


Hct 


 


MCV 


 


MCH 


 


MCHC 


 


RDW 


 


Plt Count 


 


Lymph % (Auto) 


 


Mono % (Auto) 


 


Eos % (Auto) 


 


Baso % (Auto) 


 


Lymph # 


 


Mono # 


 


Eos # 


 


Baso # 


 


Seg Neutrophils % 


 


Seg Neutrophils # 


 


Sodium 


 


Potassium 


 


Chloride 


 


Carbon Dioxide 


 


Anion Gap 


 


BUN 


 


Creatinine 


 


Estimated GFR 


 


BUN/Creatinine Ratio 


 


Glucose 


 


Lactic Acid 


 


Calcium 


 


Total Bilirubin 


 


AST 


 


ALT 


 


Alkaline Phosphatase 


 


Total Protein 


 


Albumin 


 


Albumin/Globulin Ratio 


 


Urine Color  Yellow


 


Urine Turbidity  Clear


 


Urine pH  5.0


 


Ur Specific Gravity  1.016


 


Urine Protein  <15 mg/dl


 


Urine Glucose (UA)  Neg


 


Urine Ketones  Neg


 


Urine Blood  Neg


 


Urine Nitrite  Neg


 


Urine Bilirubin  Neg


 


Urine Urobilinogen  < 2.0


 


Ur Leukocyte Esterase  Tr


 


Urine WBC (Auto)  1.0


 


Urine RBC (Auto)  1.0














Assessment and Plan


1.acute diverticulitis (unresolved despite antibiotics; initial episode earlier 

this month)





-afebrile


-WBC 14.0


-H/H WNL (14.2/42.0)- no active signs of bleeding


-repeat abd CT showed again sigmoid diverticulitis with mild progression and a 

small sigmoid intramural abscess (not drainable)


-clinically, patient is stable. Abdomen benign upon exam. No N/v. Last BM 2 days

 ago with non-bloody stool (recent constipation 2/2 narcotics for jaw pain due 

to current infected tooth)


-ID consult pending


-continue antibiotics per ID recommendations and supportive care


-patient will need outpatient colonoscopy in ~4 weeks after resolution of acute 

process (r/o IBD, neoplasm, etc.; discussed with pt-f/u clinic appt already 

scheduled) 


-recommend surgery consult for evaluation of possible surgical intervention and 

further management


-will sign off, please call if needed





<SIS WONG - Last Filed: 08/22/19 13:56>





Medications and Allergies


Active Meds: 


Active Medications





Acetaminophen (Tylenol)  650 mg PO Q4H PRN


   PRN Reason: Pain MILD(1-3)/Fever >100.5/HA


Docusate Sodium (Colace)  100 mg PO BID [facility]


Enoxaparin Sodium (Lovenox)  40 mg SUB-Q QDAY [facility]


Sodium Chloride (Nacl 0.9% 1000 Ml)  1,000 mls @ 75 mls/hr IV AS DIRECT [facility]


   Last Admin: 08/22/19 12:12 Dose:  75 mls/hr


   Documented by: 


Levofloxacin/Dextrose (Levaquin 750mg/150ml)  750 mg in 150 mls @ 100 mls/hr IV 

Q24HR [facility]; Protocol


   Last Admin: 08/22/19 12:24 Dose:  100 mls/hr


   Documented by: 


Metronidazole (Flagyl 500 Mg/100 Ml)  500 mg in 100 mls @ 100 mls/hr IV Q8HR 

[facility]; Protocol


Ondansetron HCl (Zofran)  4 mg IV Q8H PRN


   PRN Reason: Nausea And Vomiting


Oxycodone/Acetaminophen (Percocet 5/325)  1 tab PO Q6H PRN


   PRN Reason: Pain, Moderate (4-6)


Sodium Chloride (Sodium Chloride Flush Syringe 10 Ml)  10 ml IV BID [facility]


Sodium Chloride (Sodium Chloride Flush Syringe 10 Ml)  10 ml IV PRN PRN


   PRN Reason: LINE FLUSH











Exam





- Constitutional


Vital Signs: 


                                        











Temp Pulse Resp BP Pulse Ox


 


 98.4 F   77   18   140/93   97 


 


 08/22/19 12:01  08/22/19 12:01  08/22/19 12:01  08/22/19 12:01  08/22/19 12:01














- Labs


CBC & Chem 7: 


                                 08/22/19 03:19





                                 08/22/19 03:19


Lab Results: 


                         Laboratory Results - last 24 hr











  08/22/19 08/22/19 08/22/19





  03:19 03:19 04:16


 


WBC  14.0 H  


 


RBC  4.23  


 


Hgb  14.2  


 


Hct  42.0  


 


MCV  99 H  


 


MCH  34 H  


 


MCHC  34  


 


RDW  14.4  


 


Plt Count  226  


 


Lymph % (Auto)  10.4 L  


 


Mono % (Auto)  11.3 H  


 


Eos % (Auto)  2.3  


 


Baso % (Auto)  0.5  


 


Lymph #  1.4  


 


Mono #  1.6 H  


 


Eos #  0.3  


 


Baso #  0.1  


 


Seg Neutrophils %  75.5 H  


 


Seg Neutrophils #  10.5 H  


 


Sodium   137 


 


Potassium   4.2 


 


Chloride   97.8 L 


 


Carbon Dioxide   30 


 


Anion Gap   13 


 


BUN   7 L 


 


Creatinine   0.9 


 


Estimated GFR   > 60 


 


BUN/Creatinine Ratio   8 


 


Glucose   97 


 


Lactic Acid    0.60 L


 


Calcium   9.1 


 


Total Bilirubin   0.60 


 


AST   14 


 


ALT   18 


 


Alkaline Phosphatase   39 


 


Total Protein   6.2 L 


 


Albumin   3.9 


 


Albumin/Globulin Ratio   1.7 


 


Urine Color   


 


Urine Turbidity   


 


Urine pH   


 


Ur Specific Gravity   


 


Urine Protein   


 


Urine Glucose (UA)   


 


Urine Ketones   


 


Urine Blood   


 


Urine Nitrite   


 


Urine Bilirubin   


 


Urine Urobilinogen   


 


Ur Leukocyte Esterase   


 


Urine WBC (Auto)   


 


Urine RBC (Auto)   














  08/22/19





  04:22


 


WBC 


 


RBC 


 


Hgb 


 


Hct 


 


MCV 


 


MCH 


 


MCHC 


 


RDW 


 


Plt Count 


 


Lymph % (Auto) 


 


Mono % (Auto) 


 


Eos % (Auto) 


 


Baso % (Auto) 


 


Lymph # 


 


Mono # 


 


Eos # 


 


Baso # 


 


Seg Neutrophils % 


 


Seg Neutrophils # 


 


Sodium 


 


Potassium 


 


Chloride 


 


Carbon Dioxide 


 


Anion Gap 


 


BUN 


 


Creatinine 


 


Estimated GFR 


 


BUN/Creatinine Ratio 


 


Glucose 


 


Lactic Acid 


 


Calcium 


 


Total Bilirubin 


 


AST 


 


ALT 


 


Alkaline Phosphatase 


 


Total Protein 


 


Albumin 


 


Albumin/Globulin Ratio 


 


Urine Color  Yellow


 


Urine Turbidity  Clear


 


Urine pH  5.0


 


Ur Specific Gravity  1.016


 


Urine Protein  <15 mg/dl


 


Urine Glucose (UA)  Neg


 


Urine Ketones  Neg


 


Urine Blood  Neg


 


Urine Nitrite  Neg


 


Urine Bilirubin  Neg


 


Urine Urobilinogen  < 2.0


 


Ur Leukocyte Esterase  Tr


 


Urine WBC (Auto)  1.0


 


Urine RBC (Auto)  1.0














Assessment and Plan


Patient seen and examined.  Agree with note above.  Colonoscopy as outpatient 

once diverticulitis resolves as above.  Recommend surgery eval given progression

 of acute diverticulitis despite abx.  Will sign off, please call as needed.

## 2019-08-23 LAB
BASOPHILS # (AUTO): 0 K/MM3 (ref 0–0.1)
BASOPHILS NFR BLD AUTO: 0.5 % (ref 0–1.8)
BUN SERPL-MCNC: 6 MG/DL (ref 9–20)
BUN/CREAT SERPL: 8 %
CALCIUM SERPL-MCNC: 9.1 MG/DL (ref 8.4–10.2)
EOSINOPHIL # BLD AUTO: 0.2 K/MM3 (ref 0–0.4)
EOSINOPHIL NFR BLD AUTO: 2.9 % (ref 0–4.3)
HCT VFR BLD CALC: 41.6 % (ref 35.5–45.6)
HEMOLYSIS INDEX: 5
HGB BLD-MCNC: 13.9 GM/DL (ref 11.8–15.2)
LYMPHOCYTES # BLD AUTO: 1.3 K/MM3 (ref 1.2–5.4)
LYMPHOCYTES NFR BLD AUTO: 15.5 % (ref 13.4–35)
MCHC RBC AUTO-ENTMCNC: 34 % (ref 32–34)
MCV RBC AUTO: 100 FL (ref 84–94)
MONOCYTES # (AUTO): 1.1 K/MM3 (ref 0–0.8)
MONOCYTES % (AUTO): 12.8 % (ref 0–7.3)
PLATELET # BLD: 230 K/MM3 (ref 140–440)
RBC # BLD AUTO: 4.18 M/MM3 (ref 3.65–5.03)

## 2019-08-23 RX ADMIN — LEVOFLOXACIN SCH MLS/HR: 5 INJECTION, SOLUTION INTRAVENOUS at 10:43

## 2019-08-23 RX ADMIN — SODIUM CHLORIDE SCH MLS/HR: 0.9 INJECTION, SOLUTION INTRAVENOUS at 04:51

## 2019-08-23 RX ADMIN — ENOXAPARIN SODIUM SCH: 100 INJECTION SUBCUTANEOUS at 10:00

## 2019-08-23 RX ADMIN — Medication SCH ML: at 10:48

## 2019-08-23 RX ADMIN — Medication SCH ML: at 04:57

## 2019-08-23 RX ADMIN — SODIUM CHLORIDE SCH MLS/HR: 0.9 INJECTION, SOLUTION INTRAVENOUS at 23:03

## 2019-08-23 RX ADMIN — METRONIDAZOLE SCH MLS/HR: 5 INJECTION, SOLUTION INTRAVENOUS at 21:36

## 2019-08-23 RX ADMIN — METRONIDAZOLE SCH MLS/HR: 5 INJECTION, SOLUTION INTRAVENOUS at 05:01

## 2019-08-23 RX ADMIN — Medication SCH: at 21:35

## 2019-08-23 RX ADMIN — OXYCODONE AND ACETAMINOPHEN PRN TAB: 5; 325 TABLET ORAL at 04:57

## 2019-08-23 RX ADMIN — OXYCODONE AND ACETAMINOPHEN PRN TAB: 5; 325 TABLET ORAL at 21:39

## 2019-08-23 RX ADMIN — CEFTRIAXONE SODIUM SCH MLS/HR: 2 INJECTION, POWDER, FOR SOLUTION INTRAMUSCULAR; INTRAVENOUS at 16:58

## 2019-08-23 RX ADMIN — METRONIDAZOLE SCH MLS/HR: 5 INJECTION, SOLUTION INTRAVENOUS at 15:54

## 2019-08-23 RX ADMIN — DOCUSATE SODIUM SCH MG: 100 CAPSULE, LIQUID FILLED ORAL at 21:36

## 2019-08-23 RX ADMIN — DOCUSATE SODIUM SCH: 100 CAPSULE, LIQUID FILLED ORAL at 10:00

## 2019-08-23 NOTE — CONSULTATION
History of Present Illness





- Reason for Consult


Consult date: 08/23/19


Diverticulitis


Requesting physician: MAURO BALL





- History of Present Illness


The patient is a 29-year-old male with history of hypothyroidism who recently 

was admitted to the hospital with acute diverticulitis, was discharged on oral 

ciprofloxacin and Flagyl return to the emergency room yesterday with complaints 

of left lower quadrant abdominal pain.  Repeat CT scan in the emergency room 

again showed the presence of sigmoid diverticulitis with mild progression and 

intramural abscess.  Infectious diseases was consulted for antibiotic 

recommendations.  Patient states that he only took Flagyl at home did not take 

any other antibiotic.  He also reports having some subjective fevers and sweati

ng.  Denies any diarrhea.  Smokes cigarettes and marijuana.  Rare alcohol use.





Review of Systems: 


General: Subjective fever with sweating


HEENT: no new visual disturbance


Respiratory: No cough, sputum, hemoptysis or shortness of breath


Cardiovascular: No chest pain, syncope


Gastrointestinal: No nausea, vomiting or diarrhea


Genitourinary: No dysuria or hematuria


Musculoskeletal: No new or worsening neck pain or back pain 


Neurologic: No headaches, seizures


Hematologic: No easy bruising or bleeding


Endocrine: no heat/cold intolerance, no acute weight loss


Skin: negative for rash, jaundice


Psychiatric: No suicidal or homicidal ideation





Past History


Past Medical History: hypothyroidism, other (diverticulitis)


Past Surgical History: No surgical history


Social history: smoking, other (marijuana)


Family history: diabetes, hypertension, other (colon CA (uncles))





Medications and Allergies


                                    Allergies











Allergy/AdvReac Type Severity Reaction Status Date / Time


 


No Known Allergies Allergy   Verified 08/22/19 02:39











                                Home Medications











 Medication  Instructions  Recorded  Confirmed  Last Taken  Type


 


Levothyroxine [Synthroid] 75 mcg PO QAM 08/09/19 08/22/19 Unknown History


 


Ciprofloxacin HCl [Ciprofloxacin 500 mg PO Q12HR #28 tab 08/10/19 08/22/19 

08/21/19 Rx





TAB]    500 mg 


 


metroNIDAZOLE [Flagyl] 500 mg PO Q8HR #42 tablet 08/11/19 08/22/19 08/21/19 Rx





    500 mg 











Active Meds: 


Active Medications





Acetaminophen (Tylenol)  650 mg PO Q4H PRN


   PRN Reason: Pain MILD(1-3)/Fever >100.5/HA


Docusate Sodium (Colace)  100 mg PO BID [facility]


   Last Admin: 08/22/19 21:37 Dose:  100 mg


   Documented by: 


Enoxaparin Sodium (Lovenox)  40 mg SUB-Q QDAY [facility]


Sodium Chloride (Nacl 0.9% 1000 Ml)  1,000 mls @ 75 mls/hr IV AS DIRECT [facility]


   Last Admin: 08/23/19 04:51 Dose:  75 mls/hr


   Documented by: 


Levofloxacin/Dextrose (Levaquin 750mg/150ml)  750 mg in 150 mls @ 100 mls/hr IV 

Q24HR [facility]; Protocol


   Last Admin: 08/23/19 10:43 Dose:  100 mls/hr


   Documented by: 


Metronidazole (Flagyl 500 Mg/100 Ml)  500 mg in 100 mls @ 100 mls/hr IV Q8HR 

[facility]; Protocol


   Last Admin: 08/23/19 05:01 Dose:  100 mls/hr


   Documented by: 


Ondansetron HCl (Zofran)  4 mg IV Q8H PRN


   PRN Reason: Nausea And Vomiting


Oxycodone/Acetaminophen (Percocet 5/325)  1 tab PO Q6H PRN


   PRN Reason: Pain, Moderate (4-6)


   Last Admin: 08/23/19 04:57 Dose:  1 tab


   Documented by: 


Sodium Chloride (Sodium Chloride Flush Syringe 10 Ml)  10 ml IV BID [facility]


   Last Admin: 08/23/19 10:48 Dose:  10 ml


   Documented by: 


Sodium Chloride (Sodium Chloride Flush Syringe 10 Ml)  10 ml IV PRN PRN


   PRN Reason: LINE FLUSH











Physical Examination





- Physical Exam


Narrative exam: 





Physical Exam: 


Constitutional: Alert, cooperative. No acute distress


Head, Ears, Nose: Normocephalic, atraumatic. External ears, nose normal


Eyes: Conjunctivae/corneas clear. No icterus. No ptosis.


Neck: Supple, no meningeal signs


Oral: dentition fair, no thrush


Cardiovascular: S1, S2 normal. 


Respiratory: Good air entry, clear to auscultation bilaterally


GI: Soft, tender in LLQ; bowel sounds normal. No peritoneal signs


Musculoskeletal: No pedal edema, no cyanosis.


Skin: No rash or abscess


Hem/Lymphatic: No palpable cervical or supraclavicular nodes. No lymphangitis


Psych: Mood ok. Affect normal


Neurological: Awake, alert, oriented. No gross abnormality





- Constitutional


Vitals: 


                                   Vital Signs











Temp Pulse Resp BP Pulse Ox


 


 97.9 F   72   18   139/96   97 


 


 08/23/19 04:54  08/23/19 04:54  08/23/19 04:54  08/23/19 04:54  08/23/19 04:54








                           Temperature -Last 24 Hours











Temperature                    97.9 F


 


Temperature                    98.1 F


 


Temperature                    98.7 F

















Results





- Labs


CBC & Chem 7: 


                                 08/23/19 04:32





                                 08/23/19 04:32


Labs: 


                              Abnormal lab results











  08/23/19 08/23/19 Range/Units





  04:32 04:32 


 


MCV  100 H   (84-94)  fl


 


MCH  33 H   (28-32)  pg


 


Mono % (Auto)  12.8 H   (0.0-7.3)  %


 


Mono #  1.1 H   (0.0-0.8)  K/mm3


 


BUN   6 L  (9-20)  mg/dL


 


Glucose   74 L  ()  mg/dL














- Imaging and Cardiology


CT scan - abdomen: report reviewed, image reviewed (CT scan shows no 

diverticulitis with mild progression and small intramural abscess.)





Assessment and Plan





Cultures:


None





A/P:


29/M with:





1) Acute sigmoid diverticulitis and small intramural abscess, not amenable for 

drainage: GI and general surgery following.  Patient was recently discharged on 

oral ciprofloxacin and Flagyl for the same, however he only took Flagyl and did 

not take ciprofloxacin.  There is mild progression of disease.  Recommend t

reatment with IV ceftriaxone and Flagyl.





Recs:


d/c Levofloxacin


started IV Ceftriaxone 2 gm q24 hrs


continue Flagyl 500 mg q8 hrs





Rc Pineda MD, FACP


[facility] ([facility])


C: [phone]


O: [phone]


F: [phone]

## 2019-08-23 NOTE — PROGRESS NOTE
Assessment and Plan





Fulll consult dictated:





Pt known to me.   admitted few weeks prior secondary to diverticulitis.





Returns now with cc of lower abd pain but "feeling much better now"





CT -  recurrent episode of diverticulitis





Abd soft,  non tender at present.





imp -  as above





rec 





NPO


IV antibiotics





will follow





   


08/22/19 06:11





Chief complaint: 





abd pain


History of present illness: 





Patient is a 30 y/o male with PMH of hypothyroidism who was recently admitted 

earlier this month on 8/9/19 for acute diverticulitis after presenting to ED 

with abdominal pain.  Patient continues to have recurrent LLQ abdominal pain 

with repeat abd CT showing again sigmoid diverticulitis with mild progression 

and small intramural abscess (not able to drain).  The patient denies any fever 

or chills.  He reports being compliant with taking antibiotics at home 

(Cipro/flagyl). He states his abd pain improved upon discharge but has worsened 

over the past few days prompting his visit to the emergency room.  No nausea or 

vomiting. 





Past History


Past Medical History: hypothyroidism, other (diverticulitis)


Past Surgical History: No surgical history


Social history: smoking


Family history: no significant family history





                                Selected Entries











  08/23/19





  04:54


 


Temperature 97.9 F


 


Pulse Rate 72


 


Respiratory 18





Rate 


 


Blood Pressure 139/96








                                Laboratory Tests











  08/23/19





  04:32


 


WBC  8.5


 


Hgb  13.9


 


Hct  41.6














Objective


                               Vital Signs - 12hr











  08/23/19





  04:54


 


Temperature 97.9 F


 


Pulse Rate 72


 


Respiratory 18





Rate 


 


Blood Pressure 139/96


 


O2 Sat by Pulse 97





Oximetry 














- Labs





                                 08/23/19 04:32





                                 08/23/19 04:32


                                 Diabetes panel











  08/23/19 Range/Units





  04:32 


 


Sodium  137  (137-145)  mmol/L


 


Potassium  4.2  (3.6-5.0)  mmol/L


 


Chloride  100.5  ()  mmol/L


 


Carbon Dioxide  26  (22-30)  mmol/L


 


BUN  6 L  (9-20)  mg/dL


 


Creatinine  0.8  (0.8-1.5)  mg/dL


 


Glucose  74 L  ()  mg/dL


 


Calcium  9.1  (8.4-10.2)  mg/dL








                                  Calcium panel











  08/23/19 Range/Units





  04:32 


 


Calcium  9.1  (8.4-10.2)  mg/dL








                                 Pituitary panel











  08/23/19 Range/Units





  04:32 


 


Sodium  137  (137-145)  mmol/L


 


Potassium  4.2  (3.6-5.0)  mmol/L


 


Chloride  100.5  ()  mmol/L


 


Carbon Dioxide  26  (22-30)  mmol/L


 


BUN  6 L  (9-20)  mg/dL


 


Creatinine  0.8  (0.8-1.5)  mg/dL


 


Glucose  74 L  ()  mg/dL


 


Calcium  9.1  (8.4-10.2)  mg/dL








                                  Adrenal panel











  08/23/19 Range/Units





  04:32 


 


Sodium  137  (137-145)  mmol/L


 


Potassium  4.2  (3.6-5.0)  mmol/L


 


Chloride  100.5  ()  mmol/L


 


Carbon Dioxide  26  (22-30)  mmol/L


 


BUN  6 L  (9-20)  mg/dL


 


Creatinine  0.8  (0.8-1.5)  mg/dL


 


Glucose  74 L  ()  mg/dL


 


Calcium  9.1  (8.4-10.2)  mg/dL

## 2019-08-23 NOTE — CONSULTATION
REASON FOR CONSULTATION:  Rule out recurrent diverticulitis.



HISTORY OF PRESENT ILLNESS:  The patient is a healthy 29-year-old gentleman well

known to me.  He presented to the Emergency Room back on 08/09/2019 at this

institution with what turned out to be diverticulitis.  The patient was doing

well until approximately Tuesday night when his pain recurred and thus his

return to the Emergency Room.



PAST MEDICAL HISTORY:  Pertinent for hypothyroidism.



PAST SURGICAL HISTORY:  Negative.



ALLERGIES:  No known allergies.



PHYSICAL EXAMINATION:

GENERAL:  The patient states he is 

arrived.

comfortable and cooperative.  No acute distress.

VITAL SIGNS:  Shown to be afebrile with temperature 97.9, blood pressure is

139/96, pulse is 72, respirations of 18.

ABDOMEN:  Examination of the abdomen reveals to be soft and nontender at

present.



LABORATORY DATA:  Lab work includes CBC, which shows white count of 8.5, H and H

is 13 and 41.  White count was noted to be 14 on admission.  Electrolytes are

all essentially within normal limits.  CT scan of the abdomen has been

performed, which I reviewed with Radiology.  CT shows evidence of inflammation

around the sigmoid colon, but no evidence of any fluid collection or ischemic

fluid collection.  No evidence of any abscesses or microperforations.



IMPRESSION:  At this time is that of a healthy 29-year-old gentleman with

recurrent diverticulitis.



RECOMMENDATIONS:  Would be to keep the patient n.p.o. at this time until his

pain completely resolves off of any pain medication.  Also, begin IV antibiotics

as you have done.  We will obtain ID evaluation.  We will follow closely with

you.



Thank you very much for consultation.





DD: 08/23/2019 13:58

DT: 08/23/2019 23:33

JOB# [id number]  [id number]

FP/SIMON

## 2019-08-23 NOTE — PROGRESS NOTE
Assessment and Plan


Assessment and plan: 





Acute diverticulitis.  Continue with IV antibiotics and surgery consultation.  

GI following.  Await ID consultation.  Continue supportive care and pain 

control.





SIRS.  Etiology secondary to above.





Hypothyroidism.  Check TSH level





History


Interval history: 





No new issues overnight





Hospitalist Physical





- Constitutional


Vitals: 


                                        











Temp Pulse Resp BP Pulse Ox


 


 97.9 F   72   18   139/96   97 


 


 08/23/19 04:54  08/23/19 04:54  08/23/19 04:54  08/23/19 04:54  08/23/19 04:54











General appearance: Present: no acute distress, well-nourished





- EENT


Eyes: Present: PERRL, EOM intact


ENT: hearing intact, clear oral mucosa, dentition normal





- Neck


Neck: Present: supple, normal ROM





- Respiratory


Respiratory effort: normal


Respiratory: bilateral: CTA





- Cardiovascular


Rhythm: regular


Heart Sounds: Present: S1 & S2.  Absent: gallop, rub





- Extremities


Extremities: no ischemia, No edema, Full ROM





- Abdominal


General gastrointestinal: soft, non-tender, non-distended, normal bowel sounds





- Integumentary


Integumentary: Present: clear, warm, dry





- Neurologic


Neurologic: CNII-XII intact, moves all extremities





Results





- Labs


CBC & Chem 7: 


                                 08/23/19 04:32





                                 08/23/19 04:32


Labs: 


                             Laboratory Last Values











WBC  8.5 K/mm3 (4.5-11.0)   08/23/19  04:32    


 


RBC  4.18 M/mm3 (3.65-5.03)   08/23/19  04:32    


 


Hgb  13.9 gm/dl (11.8-15.2)   08/23/19  04:32    


 


Hct  41.6 % (35.5-45.6)   08/23/19  04:32    


 


MCV  100 fl (84-94)  H  08/23/19  04:32    


 


MCH  33 pg (28-32)  H  08/23/19  04:32    


 


MCHC  34 % (32-34)   08/23/19  04:32    


 


RDW  14.1 % (13.2-15.2)   08/23/19  04:32    


 


Plt Count  230 K/mm3 (140-440)   08/23/19  04:32    


 


Lymph % (Auto)  15.5 % (13.4-35.0)   08/23/19  04:32    


 


Mono % (Auto)  12.8 % (0.0-7.3)  H  08/23/19  04:32    


 


Eos % (Auto)  2.9 % (0.0-4.3)   08/23/19  04:32    


 


Baso % (Auto)  0.5 % (0.0-1.8)   08/23/19  04:32    


 


Lymph #  1.3 K/mm3 (1.2-5.4)   08/23/19  04:32    


 


Mono #  1.1 K/mm3 (0.0-0.8)  H  08/23/19  04:32    


 


Eos #  0.2 K/mm3 (0.0-0.4)   08/23/19  04:32    


 


Baso #  0.0 K/mm3 (0.0-0.1)   08/23/19  04:32    


 


Seg Neutrophils %  68.3 % (40.0-70.0)   08/23/19  04:32    


 


Seg Neutrophils #  5.8 K/mm3 (1.8-7.7)   08/23/19  04:32    


 


Sodium  137 mmol/L (137-145)   08/23/19  04:32    


 


Potassium  4.2 mmol/L (3.6-5.0)   08/23/19  04:32    


 


Chloride  100.5 mmol/L ()   08/23/19  04:32    


 


Carbon Dioxide  26 mmol/L (22-30)   08/23/19  04:32    


 


  15 mmol/L  08/23/19  04:32    


 


BUN  6 mg/dL (9-20)  L  08/23/19  04:32    


 


  0.8 mg/dL (0.8-1.5)   08/23/19  04:32    


 


Estimated GFR  > 60 ml/min  08/23/19  04:32    


 


  8 %  08/23/19  04:32    


 


Glucose  74 mg/dL ()  L  08/23/19  04:32    


 


Lactic Acid  0.60 mmol/L (0.7-2.0)  L  08/22/19  04:16    


 


Calcium  9.1 mg/dL (8.4-10.2)   08/23/19  04:32    


 


  0.60 mg/dL (0.1-1.2)   08/22/19  03:19    


 


AST  14 units/L (5-40)   08/22/19  03:19    


 


ALT  18 units/L (7-56)   08/22/19  03:19    


 


  39 units/L ()   08/22/19  03:19    


 


  6.2 g/dL (6.3-8.2)  L  08/22/19  03:19    


 


  3.9 g/dL (3.9-5)   08/22/19  03:19    


 


  1.7 %  08/22/19  03:19    


 


  Yellow  (Yellow)   08/22/19  04:22    


 


  Clear  (Clear)   08/22/19  04:22    


 


  5.0  (5.0-7.0)   08/22/19  04:22    


 


Ur Specific Gravity  1.016  (1.003-1.030)   08/22/19  04:22    


 


  <15 mg/dl mg/dL (Negative)   08/22/19  04:22    


 


  Neg mg/dL (Negative)   08/22/19  04:22    


 


  Neg mg/dL (Negative)   08/22/19  04:22    


 


  Neg  (Negative)   08/22/19  04:22    


 


  Neg  (Negative)   08/22/19  04:22    


 


  Neg  (Negative)   08/22/19  04:22    


 


  < 2.0 mg/dL (<2.0)   08/22/19  04:22    


 


Ur Leukocyte Esterase  Tr  (Negative)   08/22/19  04:22    


 


  1.0 /HPF (0.0-6.0)   08/22/19  04:22    


 


  1.0 /HPF (0.0-6.0)   08/22/19  04:22    














Active Medications





- Current Medications


Current Medications: 














Generic Name Dose Route Start Last Admin





  Trade Name Kyleigh  PRN Reason Stop Dose Admin


 


Acetaminophen  650 mg  08/22/19 11:40 





  Tylenol  PO  





  Q4H PRN  





  Pain MILD(1-3)/Fever >100.5/HA  


 


Docusate Sodium  100 mg  08/22/19 22:00  08/22/19 21:37





  Colace  PO   100 mg





  BID YANIRA   Administration


 


Enoxaparin Sodium  40 mg  08/23/19 10:00 





  Lovenox  SUB-Q  





  QDAY YANIRA  


 


Sodium Chloride  1,000 mls @ 75 mls/hr  08/22/19 12:00  08/23/19 04:51





  Nacl 0.9% 1000 Ml  IV   75 mls/hr





  AS DIRECT YANIRA   Administration


 


Levofloxacin/Dextrose  750 mg in 150 mls @ 100 mls/hr  08/22/19 12:00  08/23/19 

10:43





  Levaquin 750mg/150ml  IV   100 mls/hr





  Q24HR YANIRA   Administration





  Protocol  


 


Metronidazole  500 mg in 100 mls @ 100 mls/hr  08/22/19 14:00  08/23/19 05:01





  Flagyl 500 Mg/100 Ml  IV   100 mls/hr





  Q8HR YANIRA   Administration





  Protocol  


 


Ondansetron HCl  4 mg  08/22/19 11:40 





  Zofran  IV  





  Q8H PRN  





  Nausea And Vomiting  


 


Oxycodone/Acetaminophen  1 tab  08/22/19 11:40  08/23/19 04:57





  Percocet 5/325  PO   1 tab





  Q6H PRN   Administration





  Pain, Moderate (4-6)  


 


Sodium Chloride  10 ml  08/22/19 22:00  08/23/19 10:48





  Sodium Chloride Flush Syringe 10 Ml  IV   10 ml





  BID YANIRA   Administration


 


Sodium Chloride  10 ml  08/22/19 11:40 





  Sodium Chloride Flush Syringe 10 Ml  IV  





  PRN PRN  





  LINE FLUSH

## 2019-08-24 LAB
BASOPHILS # (AUTO): 0.1 K/MM3 (ref 0–0.1)
BASOPHILS NFR BLD AUTO: 1.1 % (ref 0–1.8)
BUN SERPL-MCNC: 9 MG/DL (ref 9–20)
BUN/CREAT SERPL: 11 %
CALCIUM SERPL-MCNC: 8.9 MG/DL (ref 8.4–10.2)
EOSINOPHIL # BLD AUTO: 0.3 K/MM3 (ref 0–0.4)
EOSINOPHIL NFR BLD AUTO: 3.3 % (ref 0–4.3)
HCT VFR BLD CALC: 41.1 % (ref 35.5–45.6)
HEMOLYSIS INDEX: 7
HGB BLD-MCNC: 13.9 GM/DL (ref 11.8–15.2)
LYMPHOCYTES # BLD AUTO: 1.1 K/MM3 (ref 1.2–5.4)
LYMPHOCYTES NFR BLD AUTO: 14.1 % (ref 13.4–35)
MCHC RBC AUTO-ENTMCNC: 34 % (ref 32–34)
MCV RBC AUTO: 100 FL (ref 84–94)
MONOCYTES # (AUTO): 1.1 K/MM3 (ref 0–0.8)
MONOCYTES % (AUTO): 14.2 % (ref 0–7.3)
PLATELET # BLD: 229 K/MM3 (ref 140–440)
RBC # BLD AUTO: 4.13 M/MM3 (ref 3.65–5.03)

## 2019-08-24 RX ADMIN — METRONIDAZOLE SCH MLS/HR: 5 INJECTION, SOLUTION INTRAVENOUS at 21:51

## 2019-08-24 RX ADMIN — DOCUSATE SODIUM SCH: 100 CAPSULE, LIQUID FILLED ORAL at 12:02

## 2019-08-24 RX ADMIN — DOCUSATE SODIUM SCH MG: 100 CAPSULE, LIQUID FILLED ORAL at 14:50

## 2019-08-24 RX ADMIN — CEFTRIAXONE SODIUM SCH MLS/HR: 2 INJECTION, POWDER, FOR SOLUTION INTRAMUSCULAR; INTRAVENOUS at 12:02

## 2019-08-24 RX ADMIN — Medication SCH ML: at 21:52

## 2019-08-24 RX ADMIN — METRONIDAZOLE SCH MLS/HR: 5 INJECTION, SOLUTION INTRAVENOUS at 05:26

## 2019-08-24 RX ADMIN — ENOXAPARIN SODIUM SCH: 100 INJECTION SUBCUTANEOUS at 12:03

## 2019-08-24 RX ADMIN — SODIUM CHLORIDE SCH MLS/HR: 0.9 INJECTION, SOLUTION INTRAVENOUS at 14:49

## 2019-08-24 RX ADMIN — DOCUSATE SODIUM SCH MG: 100 CAPSULE, LIQUID FILLED ORAL at 21:50

## 2019-08-24 RX ADMIN — OXYCODONE AND ACETAMINOPHEN PRN TAB: 5; 325 TABLET ORAL at 21:50

## 2019-08-24 RX ADMIN — Medication SCH ML: at 12:09

## 2019-08-24 RX ADMIN — METRONIDAZOLE SCH MLS/HR: 5 INJECTION, SOLUTION INTRAVENOUS at 14:50

## 2019-08-24 NOTE — PROGRESS NOTE
Assessment and Plan


Assessment and plan: 





Acute diverticulitis.  Continue with IV antibiotics.  ID change antibiotic to 

Rocephin.  Continue supportive care and pain control.





SIRS.  Etiology secondary to above.





Hypothyroidism.  Check TSH level





History


Interval history: 





No new issues overnight





Hospitalist Physical





- Constitutional


Vitals: 


                                        











Temp Pulse Resp BP Pulse Ox


 


 97.7 F   59 L  18   134/90   99 


 


 19 05:03  19 05:03  19 05:03  19 05:03  19 05:03











General appearance: Present: no acute distress, well-nourished





- EENT


Eyes: Present: PERRL, EOM intact


ENT: hearing intact, clear oral mucosa, dentition normal





- Neck


Neck: Present: supple, normal ROM





- Respiratory


Respiratory effort: normal


Respiratory: bilateral: CTA





- Cardiovascular


Rhythm: regular


Heart Sounds: Present: S1 & S2.  Absent: gallop, rub





- Extremities


Extremities: no ischemia, No edema, Full ROM





- Abdominal


General gastrointestinal: soft, non-tender, non-distended, normal bowel sounds





- Integumentary


Integumentary: Present: clear, warm, dry





- Neurologic


Neurologic: CNII-XII intact, moves all extremities





Results





- Labs


CBC & Chem 7: 


                                 19 05:50





                                 19 05:50


Labs: 


                             Laboratory Last Values











WBC  8.1 K/mm3 (4.5-11.0)   19  05:50    


 


RBC  4.13 M/mm3 (3.65-5.03)   19  05:50    


 


Hgb  13.9 gm/dl (11.8-15.2)   19  05:50    


 


Hct  41.1 % (35.5-45.6)   19  05:50    


 


MCV  100 fl (84-94)  H  19  05:50    


 


MCH  34 pg (28-32)  H  19  05:50    


 


MCHC  34 % (32-34)   19  05:50    


 


RDW  14.1 % (13.2-15.2)   19  05:50    


 


Plt Count  229 K/mm3 (140-440)   19  05:50    


 


Lymph % (Auto)  14.1 % (13.4-35.0)   19  05:50    


 


Mono % (Auto)  14.2 % (0.0-7.3)  H  19  05:50    


 


Eos % (Auto)  3.3 % (0.0-4.3)   19  05:50    


 


Baso % (Auto)  1.1 % (0.0-1.8)   19  05:50    


 


Lymph #  1.1 K/mm3 (1.2-5.4)  L  19  05:50    


 


Mono #  1.1 K/mm3 (0.0-0.8)  H  19  05:50    


 


Eos #  0.3 K/mm3 (0.0-0.4)   19  05:50    


 


Baso #  0.1 K/mm3 (0.0-0.1)   19  05:50    


 


Seg Neutrophils %  67.3 % (40.0-70.0)   19  05:50    


 


Seg Neutrophils #  5.5 K/mm3 (1.8-7.7)   19  05:50    


 


Sodium  138 mmol/L (137-145)   19  05:50    


 


Potassium  4.2 mmol/L (3.6-5.0)   19  05:50    


 


Chloride  99.0 mmol/L ()   19  05:50    


 


Carbon Dioxide  25 mmol/L (22-30)   19  05:50    


 


  18 mmol/L  19  05:50    


 


BUN  9 mg/dL (9-20)   19  05:50    


 


  0.8 mg/dL (0.8-1.5)   19  05:50    


 


Estimated GFR  > 60 ml/min  19  05:50    


 


  11 %  19  05:50    


 


Glucose  61 mg/dL ()  L  19  05:50    


 


Lactic Acid  0.60 mmol/L (0.7-2.0)  L  19  04:16    


 


Calcium  8.9 mg/dL (8.4-10.2)   19  05:50    


 


  0.60 mg/dL (0.1-1.2)   19  03:19    


 


AST  14 units/L (5-40)   19  03:19    


 


ALT  18 units/L (7-56)   19  03:19    


 


  39 units/L ()   19  03:19    


 


  6.2 g/dL (6.3-8.2)  L  19  03:19    


 


  3.9 g/dL (3.9-5)   19  03:19    


 


  1.7 %  19  03:19    


 


  Yellow  (Yellow)   19  04:22    


 


  Clear  (Clear)   19  04:22    


 


  5.0  (5.0-7.0)   19  04:22    


 


Ur Specific Gravity  1.016  (1.003-1.030)   19  04:22    


 


  <15 mg/dl mg/dL (Negative)   19  04:22    


 


  Neg mg/dL (Negative)   19  04:22    


 


  Neg mg/dL (Negative)   19  04:22    


 


  Neg  (Negative)   19  04:22    


 


  Neg  (Negative)   19  04:22    


 


  Neg  (Negative)   19  04:22    


 


  < 2.0 mg/dL (<2.0)   19  04:22    


 


Ur Leukocyte Esterase  Tr  (Negative)   19  04:22    


 


  1.0 /HPF (0.0-6.0)   19  04:22    


 


  1.0 /HPF (0.0-6.0)   19  04:22    














Active Medications





- Current Medications


Current Medications: 














Generic Name Dose Route Start Last Admin





  Trade Name Kyleigh  PRN Reason Stop Dose Admin


 


Acetaminophen  650 mg  19 11:40 





  Tylenol  PO  





  Q4H PRN  





  Pain MILD(1-3)/Fever >100.5/HA  


 


Docusate Sodium  100 mg  19 22:00  19 21:36





  Colace  PO   100 mg





  BID YANIRA   Administration


 


Enoxaparin Sodium  40 mg  19 10:00  19 10:00





  Lovenox  SUB-Q   Not Given





  QDAY YANIRA  


 


Sodium Chloride  1,000 mls @ 75 mls/hr  19 12:00  19 23:03





  Nacl 0.9% 1000 Ml  IV   75 mls/hr





  AS DIRECT YANIRA   Administration


 


Metronidazole  500 mg in 100 mls @ 100 mls/hr  19 14:00  19 05:26





  Flagyl 500 Mg/100 Ml  IV   100 mls/hr





  Q8HR YANIRA   Administration





  Protocol  


 


Ceftriaxone Sodium  2 gm in 100 mls @ 200 mls/hr  19 16:00  19 16:58





  Rocephin/Ns 2 Gm/100 Ml  IV   200 mls/hr





  Q24HR YANIRA   Administration





  Protocol  


 


Morphine Sulfate  1 mg  19 15:42  19 16:46





  Morphine  IV   1 mg





  Q4H PRN   Administration





  Pain, Moderate (4-6)  


 


Ondansetron HCl  4 mg  19 11:40 





  Zofran  IV  





  Q8H PRN  





  Nausea And Vomiting  


 


Oxycodone/Acetaminophen  1 tab  19 11:40  19 21:39





  Percocet 5/325  PO   1 tab





  Q6H PRN   Administration





  Pain, Moderate (4-6)  


 


Sodium Chloride  10 ml  19 22:00  19 21:35





  Sodium Chloride Flush Syringe 10 Ml  IV   Not Given





  BID YANIRA  


 


Sodium Chloride  10 ml  19 11:40 





  Sodium Chloride Flush Syringe 10 Ml  IV  





  PRN PRN  





  LINE FLUSH  














Nutrition/Malnutrition Assess





- Dietary Evaluation


Nutrition/Malnutrition Findings: 


                                        





Nutrition Notes                                            Start:  19 

14:29


Freq:                                                      Status: Active       




Protocol:                                                                       




 Document     19 14:29  FAREED  (Rec: 19 14:37  [facility]  [email or web address]

[email or web address])


 Nutrition Notes


     Need for Assessment generated from:         MD Order,RN Referral,[facility]


     Initial or Follow up                        Assessment


     Other Pertinent Diagnosis                   Acute diverticulitis


     Current Diet                                NPO


     Labs/Tests                                  reviewed


     Pertinent Medications                       Colace


     Height                                      6 ft 3 in


     Weight                                      96.82 kg


     Usual Body Weight                           97 kg


     Ideal Body Weight (kg)                      89.09


     BMI                                         26.6


     Intake Prior to Admission                   Fair


     Weight Status                               Appropriate


     Subjective/Other Information                RD consulted for poor oral


                                                 intake; pt also screened for


                                                 malnutrition risk (wt loss,


                                                 poor appetite) and skin risk (


                                                 Charles score: 20).  He reports


                                                 last PO meal on .  He


                                                 admits to consuming a low


                                                 fiber diet at home.  Very


                                                 receptive to diet education.


                                                 Being followed by surgery.


     Burn                                        Absent


     Trauma                                      Absent


     #1


      Nutrition Diagnosis                        Altered GI function


      Etiology                                   diverticulitis


      As Evidenced by Signs and Symptoms         pt NPO since 


     Is patient on ventilator?                   No


     Is Patient Ambulatory and/or Out of Bed     Yes


     REE-([facility]-ambulatory/OOB) [     2611.479


      [email or web address]]                               


     Calculation Used for Recommendations        [facility]


     Additional Notes                            Pro needs 0.8-1g/k-96g/


                                                 day


                                                 Fluid needs 1ml/kcal


 Nutrition Intervention


     Change Diet Order:                          Diet advancement when


                                                 medically feasible


     Teaching Recipient                          Patient


     Learning Readiness                          Good


     Teaching Methods                            Discussion,Handout


     Response to Teaching                        Verbalize understanding


     Education Handouts Provided                 Low-Fiber Nutrition Therapy


                                                 High-Fiber Nutrition Therapy


     Barriers to Learning                        No Barriers


     RD phone number provided                    Yes


     Patient aware of follow up options          Yes


     Goal #1                                     Pt to increase daily dietary


                                                 fiber intake when


                                                 diverticulitis resolved


     Anticipated Discharge Needs:                High-Fiber diet


     Follow-Up By:                               19


     Additional Comments                         F/U: diet advancement

## 2019-08-24 NOTE — PROGRESS NOTE
Assessment and Plan





Pt feeling better.   denies pain at present.  required some narcotics for pain 

relief yesterday but none today.





Abd soft,  non tender





ID eval appreciated





started on Rocephin





surgically stable





begin ice chips,  popsicles and po meds.





may advance to  liq in am if no pain throughout the day





                                Selected Entries











  08/24/19 08/24/19





  05:03 11:23


 


Temperature  98.9 F


 


Pulse Rate  74


 


Respiratory 18 





Rate  


 


Blood Pressure  142/94








                                Laboratory Tests











  08/24/19





  05:50


 


WBC  8.1














Objective


                               Vital Signs - 12hr











  08/24/19 08/24/19





  05:03 11:23


 


Temperature 97.7 F 98.9 F


 


Pulse Rate 59 L 74


 


Respiratory 18 22





Rate  


 


Blood Pressure 134/90 142/94


 


O2 Sat by Pulse 99 98





Oximetry  














- Labs





                                 08/24/19 05:50





                                 08/24/19 05:50


                                 Diabetes panel











  08/24/19 Range/Units





  05:50 


 


Sodium  138  (137-145)  mmol/L


 


Potassium  4.2  (3.6-5.0)  mmol/L


 


Chloride  99.0  ()  mmol/L


 


Carbon Dioxide  25  (22-30)  mmol/L


 


BUN  9  (9-20)  mg/dL


 


Creatinine  0.8  (0.8-1.5)  mg/dL


 


Glucose  61 L  ()  mg/dL


 


Calcium  8.9  (8.4-10.2)  mg/dL








                                  Thyroid panel











  08/24/19 Range/Units





  09:57 


 


TSH  12.760 H  (0.270-4.200)  mlU/mL








                                  Calcium panel











  08/24/19 Range/Units





  05:50 


 


Calcium  8.9  (8.4-10.2)  mg/dL








                                 Pituitary panel











  08/24/19 08/24/19 Range/Units





  05:50 09:57 


 


Sodium  138   (137-145)  mmol/L


 


Potassium  4.2   (3.6-5.0)  mmol/L


 


Chloride  99.0   ()  mmol/L


 


Carbon Dioxide  25   (22-30)  mmol/L


 


BUN  9   (9-20)  mg/dL


 


Creatinine  0.8   (0.8-1.5)  mg/dL


 


Glucose  61 L   ()  mg/dL


 


Calcium  8.9   (8.4-10.2)  mg/dL


 


TSH   12.760 H  (0.270-4.200)  mlU/mL








                                  Adrenal panel











  08/24/19 Range/Units





  05:50 


 


Sodium  138  (137-145)  mmol/L


 


Potassium  4.2  (3.6-5.0)  mmol/L


 


Chloride  99.0  ()  mmol/L


 


Carbon Dioxide  25  (22-30)  mmol/L


 


BUN  9  (9-20)  mg/dL


 


Creatinine  0.8  (0.8-1.5)  mg/dL


 


Glucose  61 L  ()  mg/dL


 


Calcium  8.9  (8.4-10.2)  mg/dL

## 2019-08-25 RX ADMIN — Medication SCH ML: at 11:02

## 2019-08-25 RX ADMIN — DOCUSATE SODIUM SCH MG: 100 CAPSULE, LIQUID FILLED ORAL at 11:01

## 2019-08-25 RX ADMIN — METRONIDAZOLE SCH MLS/HR: 5 INJECTION, SOLUTION INTRAVENOUS at 15:03

## 2019-08-25 RX ADMIN — SODIUM CHLORIDE SCH MLS/HR: 0.9 INJECTION, SOLUTION INTRAVENOUS at 22:28

## 2019-08-25 RX ADMIN — DOCUSATE SODIUM SCH MG: 100 CAPSULE, LIQUID FILLED ORAL at 22:27

## 2019-08-25 RX ADMIN — METRONIDAZOLE SCH MLS/HR: 5 INJECTION, SOLUTION INTRAVENOUS at 05:00

## 2019-08-25 RX ADMIN — LEVOTHYROXINE SODIUM SCH MCG: 0.07 TABLET ORAL at 11:14

## 2019-08-25 RX ADMIN — CEFTRIAXONE SODIUM SCH MLS/HR: 2 INJECTION, POWDER, FOR SOLUTION INTRAMUSCULAR; INTRAVENOUS at 11:00

## 2019-08-25 RX ADMIN — ENOXAPARIN SODIUM SCH: 100 INJECTION SUBCUTANEOUS at 11:01

## 2019-08-25 RX ADMIN — Medication SCH: at 22:28

## 2019-08-25 RX ADMIN — METRONIDAZOLE SCH MLS/HR: 5 INJECTION, SOLUTION INTRAVENOUS at 22:27

## 2019-08-25 RX ADMIN — SODIUM CHLORIDE SCH MLS/HR: 0.9 INJECTION, SOLUTION INTRAVENOUS at 04:58

## 2019-08-25 NOTE — PROGRESS NOTE
Assessment and Plan





Pt feeling well.  denies pain





Abd soft,  non tender





surgically stable





may advance to full liq in am as jabari





antibiotics as per ID





continue present care





                                Selected Entries











  08/25/19 08/25/19





  05:01 11:09


 


Temperature  98.1 F


 


Pulse Rate  76


 


Respiratory 18 





Rate  


 


Blood Pressure  127/91














Objective


                               Vital Signs - 12hr











  08/25/19 08/25/19





  05:01 11:09


 


Temperature 98.0 F 98.1 F


 


Pulse Rate 53 L 76


 


Respiratory 18 20





Rate  


 


Blood Pressure 132/83 127/91


 


O2 Sat by Pulse 96 96





Oximetry  














- Labs





                                 08/24/19 05:50





                                 08/24/19 05:50

## 2019-08-26 RX ADMIN — METRONIDAZOLE SCH MLS/HR: 5 INJECTION, SOLUTION INTRAVENOUS at 22:24

## 2019-08-26 RX ADMIN — Medication SCH: at 11:10

## 2019-08-26 RX ADMIN — DOCUSATE SODIUM SCH MG: 100 CAPSULE, LIQUID FILLED ORAL at 11:05

## 2019-08-26 RX ADMIN — DOCUSATE SODIUM SCH MG: 100 CAPSULE, LIQUID FILLED ORAL at 22:26

## 2019-08-26 RX ADMIN — OXYCODONE AND ACETAMINOPHEN PRN TAB: 5; 325 TABLET ORAL at 19:02

## 2019-08-26 RX ADMIN — METRONIDAZOLE SCH MLS/HR: 5 INJECTION, SOLUTION INTRAVENOUS at 14:34

## 2019-08-26 RX ADMIN — METRONIDAZOLE SCH MLS/HR: 5 INJECTION, SOLUTION INTRAVENOUS at 06:36

## 2019-08-26 RX ADMIN — LEVOTHYROXINE SODIUM SCH MCG: 0.07 TABLET ORAL at 06:36

## 2019-08-26 RX ADMIN — CEFTRIAXONE SODIUM SCH MLS/HR: 2 INJECTION, POWDER, FOR SOLUTION INTRAMUSCULAR; INTRAVENOUS at 11:04

## 2019-08-26 RX ADMIN — SODIUM CHLORIDE SCH MLS/HR: 0.9 INJECTION, SOLUTION INTRAVENOUS at 22:25

## 2019-08-26 RX ADMIN — Medication SCH ML: at 22:24

## 2019-08-26 RX ADMIN — ENOXAPARIN SODIUM SCH: 100 INJECTION SUBCUTANEOUS at 11:10

## 2019-08-26 NOTE — PROGRESS NOTE
Assessment and Plan


Assessment and plan: 





Acute diverticulitis.  Continue with IV antibiotics per ID recommendations.  

Continue supportive care and pain control.  Diet advanced per surgery.





SIRS.  Etiology secondary to above.





Hypothyroidism.  TSH level high.  Patient apparently has been off Synthroid.  

Restarted medication.





Disposition.  Anticipate discharge in a.m. if tolerating diet.





History


Interval history: 


The patient is a 29-year-old male with history of hypothyroidism who recently 

was admitted to the hospital with acute diverticulitis, was discharged on oral 

ciprofloxacin and Flagyl return to the emergency room yesterday with complaints 

of left lower quadrant abdominal pain.  Repeat CT scan in the emergency room 

again showed the presence of sigmoid diverticulitis with mild progression and 

intramural abscess.  Infectious diseases was consulted for antibiotic 

recommendations.  ID added Rocephin to the regimen.  Patient has had progressive

improvement.  Patient's diet was advanced to clear liquids per surgery.


No new issues overnight





Hospitalist Physical





- Constitutional


Vitals: 


                                        











Temp Pulse Resp BP Pulse Ox


 


 98.3 F   68   20   125/84   98 


 


 19 05:22  19 05:22  19 05:22  19 05:22  19 05:22











General appearance: Present: no acute distress, well-nourished





- EENT


Eyes: Present: PERRL, EOM intact


ENT: hearing intact, clear oral mucosa, dentition normal





- Neck


Neck: Present: supple, normal ROM





- Respiratory


Respiratory effort: normal


Respiratory: bilateral: CTA





- Cardiovascular


Rhythm: regular


Heart Sounds: Present: S1 & S2.  Absent: gallop, rub





- Extremities


Extremities: no ischemia, No edema, Full ROM





- Abdominal


General gastrointestinal: soft, non-tender, non-distended, normal bowel sounds





- Integumentary


Integumentary: Present: clear, warm, dry





- Neurologic


Neurologic: CNII-XII intact, moves all extremities





Results





- Labs


CBC & Chem 7: 


                                 19 05:50





                                 19 05:50


Labs: 


                             Laboratory Last Values











WBC  8.1 K/mm3 (4.5-11.0)   19  05:50    


 


RBC  4.13 M/mm3 (3.65-5.03)   19  05:50    


 


Hgb  13.9 gm/dl (11.8-15.2)   19  05:50    


 


Hct  41.1 % (35.5-45.6)   19  05:50    


 


MCV  100 fl (84-94)  H  19  05:50    


 


MCH  34 pg (28-32)  H  19  05:50    


 


MCHC  34 % (32-34)   19  05:50    


 


RDW  14.1 % (13.2-15.2)   19  05:50    


 


Plt Count  229 K/mm3 (140-440)   19  05:50    


 


Lymph % (Auto)  14.1 % (13.4-35.0)   19  05:50    


 


Mono % (Auto)  14.2 % (0.0-7.3)  H  19  05:50    


 


Eos % (Auto)  3.3 % (0.0-4.3)   19  05:50    


 


Baso % (Auto)  1.1 % (0.0-1.8)   19  05:50    


 


Lymph #  1.1 K/mm3 (1.2-5.4)  L  19  05:50    


 


Mono #  1.1 K/mm3 (0.0-0.8)  H  19  05:50    


 


Eos #  0.3 K/mm3 (0.0-0.4)   19  05:50    


 


Baso #  0.1 K/mm3 (0.0-0.1)   19  05:50    


 


Seg Neutrophils %  67.3 % (40.0-70.0)   19  05:50    


 


Seg Neutrophils #  5.5 K/mm3 (1.8-7.7)   19  05:50    


 


Sodium  138 mmol/L (137-145)   19  05:50    


 


Potassium  4.2 mmol/L (3.6-5.0)   19  05:50    


 


Chloride  99.0 mmol/L ()   19  05:50    


 


Carbon Dioxide  25 mmol/L (22-30)   19  05:50    


 


  18 mmol/L  19  05:50    


 


BUN  9 mg/dL (9-20)   19  05:50    


 


  0.8 mg/dL (0.8-1.5)   19  05:50    


 


Estimated GFR  > 60 ml/min  19  05:50    


 


  11 %  19  05:50    


 


Glucose  61 mg/dL ()  L  19  05:50    


 


Lactic Acid  0.60 mmol/L (0.7-2.0)  L  19  04:16    


 


Calcium  8.9 mg/dL (8.4-10.2)   19  05:50    


 


  0.60 mg/dL (0.1-1.2)   19  03:19    


 


AST  14 units/L (5-40)   19  03:19    


 


ALT  18 units/L (7-56)   19  03:19    


 


  39 units/L ()   19  03:19    


 


  6.2 g/dL (6.3-8.2)  L  19  03:19    


 


  3.9 g/dL (3.9-5)   19  03:19    


 


  1.7 %  19  03:19    


 


TSH  12.760 mlU/mL (0.270-4.200)  H  19  09:57    


 


  Yellow  (Yellow)   19  04:22    


 


  Clear  (Clear)   19  04:22    


 


  5.0  (5.0-7.0)   19  04:22    


 


Ur Specific Gravity  1.016  (1.003-1.030)   19  04:22    


 


  <15 mg/dl mg/dL (Negative)   19  04:22    


 


  Neg mg/dL (Negative)   19  04:22    


 


  Neg mg/dL (Negative)   19  04:22    


 


  Neg  (Negative)   19  04:22    


 


  Neg  (Negative)   19  04:22    


 


  Neg  (Negative)   19  04:22    


 


  < 2.0 mg/dL (<2.0)   19  04:22    


 


Ur Leukocyte Esterase  Tr  (Negative)   19  04:22    


 


  1.0 /HPF (0.0-6.0)   19  04:22    


 


  1.0 /HPF (0.0-6.0)   19  04:22    














Active Medications





- Current Medications


Current Medications: 














Generic Name Dose Route Start Last Admin





  Trade Name Freq  PRN Reason Stop Dose Admin


 


Acetaminophen  650 mg  19 11:40 





  Tylenol  PO  





  Q4H PRN  





  Pain MILD(1-3)/Fever >100.5/HA  


 


Docusate Sodium  100 mg  19 22:00  19 22:27





  Colace  PO   100 mg





  BID YANIRA   Administration


 


Enoxaparin Sodium  40 mg  19 10:00  19 11:01





  Lovenox  SUB-Q   Not Given





  QDAY YANIRA  


 


Sodium Chloride  1,000 mls @ 75 mls/hr  19 12:00  19 22:28





  Nacl 0.9% 1000 Ml  IV   75 mls/hr





  AS DIRECT YANIRA   Administration


 


Metronidazole  500 mg in 100 mls @ 100 mls/hr  19 14:00  19 06:36





  Flagyl 500 Mg/100 Ml  IV   100 mls/hr





  Q8HR YANIRA   Administration





  Protocol  


 


Ceftriaxone Sodium  2 gm in 100 mls @ 200 mls/hr  19 16:00  19 11:00





  Rocephin/Ns 2 Gm/100 Ml  IV   200 mls/hr





  Q24HR YANIRA   Administration





  Protocol  


 


Levothyroxine Sodium  75 mcg  19 10:00  19 06:36





  Synthroid  PO   75 mcg





  0600 YANIRA   Administration


 


Morphine Sulfate  1 mg  19 15:42  19 16:46





  Morphine  IV   1 mg





  Q4H PRN   Administration





  Pain, Moderate (4-6)  


 


Ondansetron HCl  4 mg  19 11:40 





  Zofran  IV  





  Q8H PRN  





  Nausea And Vomiting  


 


Oxycodone/Acetaminophen  1 tab  19 11:40  19 21:50





  Percocet 5/325  PO   1 tab





  Q6H PRN   Administration





  Pain, Moderate (4-6)  


 


Sodium Chloride  10 ml  19 22:00  19 22:28





  Sodium Chloride Flush Syringe 10 Ml  IV   Not Given





  BID YANIRA  


 


Sodium Chloride  10 ml  19 11:40 





  Sodium Chloride Flush Syringe 10 Ml  IV  





  PRN PRN  





  LINE FLUSH  














Nutrition/Malnutrition Assess





- Dietary Evaluation


Nutrition/Malnutrition Findings: 


                                        





Nutrition Notes                                            Start:  19 

14:29


Freq:                                                      Status: Active       




Protocol:                                                                       




 Document     19 14:29  FAREED  (Rec: 19 14:37  FAREED  SRW-

[id number])


 Nutrition Notes


     Need for Assessment generated from:         MD Order,RN Referral,MST


     Initial or Follow up                        Assessment


     Other Pertinent Diagnosis                   Acute diverticulitis


     Current Diet                                NPO


     Labs/Tests                                  reviewed


     Pertinent Medications                       Colace


     Height                                      6 ft 3 in


     Weight                                      96.82 kg


     Usual Body Weight                           97 kg


     Ideal Body Weight (kg)                      89.09


     BMI                                         26.6


     Intake Prior to Admission                   Fair


     Weight Status                               Appropriate


     Subjective/Other Information                RD consulted for poor oral


                                                 intake; pt also screened for


                                                 malnutrition risk (wt loss,


                                                 poor appetite) and skin risk (


                                                 Charles score: 20).  He reports


                                                 last PO meal on .  He


                                                 admits to consuming a low


                                                 fiber diet at home.  Very


                                                 receptive to diet education.


                                                 Being followed by surgery.


     Burn                                        Absent


     Trauma                                      Absent


     #1


      Nutrition Diagnosis                        Altered GI function


      Etiology                                   diverticulitis


      As Evidenced by Signs and Symptoms         pt NPO since 


     Is patient on ventilator?                   No


     Is Patient Ambulatory and/or Out of Bed     Yes


     REE-([place] Jeor-ambulatory/OOB) [     2611.479


      [email or web address]]                               


     Calculation Used for Recommendations        [place] or


     Additional Notes                            Pro needs 0.8-1g/k-96g/


                                                 day


                                                 Fluid needs 1ml/kcal


 Nutrition Intervention


     Change Diet Order:                          Diet advancement when


                                                 medically feasible


     Teaching Recipient                          Patient


     Learning Readiness                          Good


     Teaching Methods                            Discussion,Handout


     Response to Teaching                        Verbalize understanding


     Education Handouts Provided                 Low-Fiber Nutrition Therapy


                                                 High-Fiber Nutrition Therapy


     Barriers to Learning                        No Barriers


     RD phone number provided                    Yes


     Patient aware of follow up options          Yes


     Goal #1                                     Pt to increase daily dietary


                                                 fiber intake when


                                                 diverticulitis resolved


     Anticipated Discharge Needs:                High-Fiber diet


     Follow-Up By:                               19


     Additional Comments                         F/U: diet advancement

## 2019-08-26 NOTE — PROGRESS NOTE
Assessment and Plan





Pt feeling well without compl.  jabari full liq.  neg pain





Abd soft,  non tender





resolving sigmoid diverticulitis





surgically stable





advance to solid high fiber diet





may d/c in am if diet jabari and pain free





antibiotics Rx plan as per ID





will need f/u with ID, PCP, GI as well as myself





Objective


                               Vital Signs - 12hr











  08/26/19





  05:22


 


Temperature 98.3 F


 


Pulse Rate 68


 


Respiratory 20





Rate 


 


Blood Pressure 125/84


 


O2 Sat by Pulse 98





Oximetry 














- Labs





                                 08/24/19 05:50





                                 08/24/19 05:50

## 2019-08-26 NOTE — PROGRESS NOTE
Assessment and Plan





Cultures:


None





A/P:


29/M with:





1) Acute sigmoid diverticulitis and small intramural abscess, not amenable for 

drainage: GI and general surgery following.  Patient was recently discharged on 

oral ciprofloxacin and Flagyl for the same, however he only took Flagyl and did 

not take ciprofloxacin.  There is mild progression of disease.  Recommend 

treatment with IV ceftriaxone and Flagyl.





Recs:


Continue  IV Ceftriaxone 2 gm q24 hrs, D4


continue Flagyl 500 mg q8 hrs, D5


Anticipate discharge on Augmentin 875 PO BID and Levaquin 750mg q 24 hours for 

14 days. 





Zulay Aditya, NP


[facility] 


M: [phone]


O:[phone]








Subjective


Date of service: 08/26/19


Interval history: 





Patient seen and examined. Reports improved abdominal symptoms, able to tolerate

regular diet. No fevers.





Objective





- Exam


Narrative Exam: 





Constitutional: Alert, cooperative. No acute distress


Head, Ears, Nose: Normocephalic, atraumatic. External ears, nose normal


Eyes: Conjunctivae/corneas clear. No icterus. No ptosis.


Neck: Supple, no meningeal signs


Oral: dentition fair, no thrush


Cardiovascular: S1, S2 normal. 


Respiratory: Good air entry, clear to auscultation bilaterally


GI: Soft, tender in LLQ; bowel sounds normal. + flatulence . No peritoneal signs


Musculoskeletal: No pedal edema, no cyanosis.


Skin: No rash or abscess


Hem/Lymphatic: No palpable cervical or supraclavicular nodes. No lymphangitis


Psych: Mood ok. Affect normal


Neurological: Awake, alert, oriented. No gross abnormality








- Constitutional


Vitals: 


                                   Vital Signs











Temp Pulse Resp BP Pulse Ox


 


 98.3 F   68   20   125/84   98 


 


 08/26/19 05:22  08/26/19 05:22  08/26/19 05:22  08/26/19 05:22  08/26/19 05:22








                           Temperature -Last 24 Hours











Temperature                    98.3 F


 


Temperature                    98.5 F


 


Temperature                    99.3 F


 


Temperature                    98.1 F

















- Labs


CBC & Chem 7: 


                                 08/24/19 05:50





                                 08/24/19 05:50

## 2019-08-26 NOTE — DISCHARGE SUMMARY
Providers





- Providers


Date of Admission: 


08/22/19 06:11





Date of discharge: 08/26/19


Attending physician: 


SINDY HERNANDEZ





                                        





08/22/19 06:10


Consult to Physician [CONS] Urgent 


   Comment: 


   Consulting Provider: DOROTA GTZ


   Physician Instructions: 


   Reason For Exam: diverticulitis





08/22/19 10:57


Consult to Dietitian/Nutrition [CONS] Routine 


   Physician Instructions: 


   Reason For Exam: 


   Reason for Consult: Poor oral intake





08/22/19 15:05


Consult to Physician [CONS] Routine 


   Comment: 


   Consulting Provider: MAURO BALL


   Physician Instructions: consult surgery


   Reason For Exam: recurrent diverticulitis





08/23/19 13:54


Consult to Physician [CONS] Routine 


   Comment: 


   Consulting Provider: OLIVIA CARSON


   Physician Instructions: 


   Reason For Exam: diverticulitis











Primary care physician: 


[facility], MD








Hospitalization


Reason for admission: diverticulitis


Condition: Stable


Hospital course: 





The patient is a 29-year-old male with history of hypothyroidism who recently 

was admitted to the hospital with acute diverticulitis, was discharged on oral 

ciprofloxacin and Flagyl return to the emergency room on 8/22/19 with complaints

 of left lower quadrant abdominal pain.  Repeat CT scan in the emergency room 

again showed the presence of sigmoid diverticulitis with mild progression and 

intramural abscess.  Infectious diseases was consulted for antibiotic 

recommendations.  The patient was started on Levaquin and Flagyl in the 

emergency room.  ID changed antibiotic to ceftriaxone and Flagyl.  The patient 

has slow but significant improvement throughout hospital stay.  Surgery was also

 consultation and did not recommend intervention.  Diet was later advanced was 

patient tolerated it well.  The patient is felt to proceed maximal hospital 

benefit and will be discharged home.  ID recommends Augmentin and Levaquin for 2

 weeks.  Dedicated discharge time 35 minutes.


Disposition: DC-01 TO HOME OR SELFCARE


Time spent for discharge: 35





- Discharge Diagnoses


(1) Abdominal pain


Status: Acute   


Qualifiers: 


   Abdominal location: left lower quadrant   Qualified Code(s): R10.32 - Left 

lower quadrant pain   





(2) Constipation


Status: Acute   


Qualifiers: 


   Constipation type: unspecified constipation type   Qualified Code(s): K59.00 

- Constipation, unspecified   





(3) Diverticulitis large intestine


Status: Acute   


Qualifiers: 


   Diverticulitis bleeding: with bleeding   Diverticulitis complication: with 

abscess   Qualified Code(s): K57.21 - Diverticulitis of large intestine with 

perforation and abscess with bleeding   





Core Measure Documentation





- Palliative Care


Palliative Care/ Comfort Measures: Not Applicable





- Core Measures


Any of the following diagnoses?: none





Exam





- Constitutional


Vitals: 


                                        











Temp Pulse Resp BP Pulse Ox


 


 97.6 F   79   18   144/90   99 


 


 08/26/19 11:55  08/26/19 11:55  08/26/19 11:55  08/26/19 11:55  08/26/19 11:55











General appearance: Present: no acute distress, well-nourished





- EENT


Eyes: Present: PERRL


ENT: hearing intact, clear oral mucosa





- Neck


Neck: Present: supple, normal ROM





- Respiratory


Respiratory effort: normal


Respiratory: bilateral: CTA





- Cardiovascular


Heart Sounds: Present: S1 & S2.  Absent: rub, click





- Extremities


Extremities: pulses symmetrical, No edema


Peripheral Pulses: within normal limits





- Abdominal


General gastrointestinal: Present: soft, non-tender, non-distended, normal bowel

 sounds


Male genitourinary: Present: normal





- Integumentary


Integumentary: Present: clear, warm, dry





- Musculoskeletal


Musculoskeletal: gait normal, strength equal bilaterally





- Psychiatric


Psychiatric: appropriate mood/affect, intact judgment & insight





- Neurologic


Neurologic: CNII-XII intact, moves all extremities





Plan


Activity: no restrictions


Weight Bearing Status: Full Weight Bearing


Diet: regular


[facility]: 


[facility] - 3-5 Days


CASEY ISSA MD [Staff Physician] - 7 Days


MAURO BALL MD [Staff Physician] - 7 Days


Prescriptions: 


Amoxicillin/Potassium Clav [Augmentin 875-125 Tablet] 1 each PO BID #28 tablet


levoFLOXacin [Levaquin TAB] 500 mg PO QDAY #14 tablet


oxyCODONE /ACETAMINOPHEN [Percocet 5/325 mg] 1 tab PO Q6H PRN #12 tablet


 PRN Reason: Pain, Moderate (4-6)

## 2019-08-27 VITALS — DIASTOLIC BLOOD PRESSURE: 88 MMHG | SYSTOLIC BLOOD PRESSURE: 143 MMHG

## 2019-08-27 RX ADMIN — ENOXAPARIN SODIUM SCH: 100 INJECTION SUBCUTANEOUS at 10:20

## 2019-08-27 RX ADMIN — CEFTRIAXONE SODIUM SCH: 2 INJECTION, POWDER, FOR SOLUTION INTRAMUSCULAR; INTRAVENOUS at 10:20

## 2019-08-27 RX ADMIN — DOCUSATE SODIUM SCH: 100 CAPSULE, LIQUID FILLED ORAL at 10:20

## 2019-08-27 RX ADMIN — Medication SCH: at 10:20

## 2019-08-27 RX ADMIN — METRONIDAZOLE SCH MLS/HR: 5 INJECTION, SOLUTION INTRAVENOUS at 05:07

## 2019-08-27 RX ADMIN — LEVOTHYROXINE SODIUM SCH MCG: 0.07 TABLET ORAL at 05:07

## 2019-08-27 NOTE — PROGRESS NOTE
Assessment and Plan





Pt fol solid diet without compl





Abd soft,  non tender





surgically stable





to be d/c'd today





rto next tues (I wk)





antibiotics as per ID





Objective


                               Vital Signs - 12hr











  08/26/19 08/26/19 08/27/19





  22:00 22:31 05:04


 


Temperature  97.9 F 98.1 F


 


Pulse Rate  76 76


 


Respiratory 18 20 18





Rate   


 


Blood Pressure  128/89 143/88


 


O2 Sat by Pulse  96 98





Oximetry   














- Labs





                                 08/24/19 05:50





                                 08/24/19 05:50

## 2020-10-20 NOTE — PROGRESS NOTE
Assessment and Plan


Assessment and plan: 





Acute diverticulitis.  Continue with IV antibiotics per ID recommendations.  

Continue supportive care and pain control.





SIRS.  Etiology secondary to above.





Hypothyroidism.  TSH level high.  Patient apparently has been off Synthroid.  

Restart medication.





History


Interval history: 





No new issues overnight





Hospitalist Physical





- Constitutional


Vitals: 


                                        











Temp Pulse Resp BP Pulse Ox


 


 98.0 F   53 L  18   132/83   96 


 


 19 05:01  19 05:01  19 05:01  19 05:01  19 05:01











General appearance: Present: no acute distress, well-nourished





- EENT


Eyes: Present: PERRL, EOM intact


ENT: hearing intact, clear oral mucosa, dentition normal





- Neck


Neck: Present: supple, normal ROM





- Respiratory


Respiratory effort: normal


Respiratory: bilateral: CTA





- Cardiovascular


Rhythm: regular


Heart Sounds: Present: S1 & S2.  Absent: gallop, rub





- Extremities


Extremities: no ischemia, No edema, Full ROM





- Abdominal


General gastrointestinal: soft, non-tender, non-distended, normal bowel sounds





- Integumentary


Integumentary: Present: clear, warm, dry





- Neurologic


Neurologic: CNII-XII intact, moves all extremities





Results





- Labs


CBC & Chem 7: 


                                 19 05:50





                                 19 05:50


Labs: 


                             Laboratory Last Values











WBC  8.1 K/mm3 (4.5-11.0)   19  05:50    


 


RBC  4.13 M/mm3 (3.65-5.03)   19  05:50    


 


Hgb  13.9 gm/dl (11.8-15.2)   19  05:50    


 


Hct  41.1 % (35.5-45.6)   19  05:50    


 


MCV  100 fl (84-94)  H  19  05:50    


 


MCH  34 pg (28-32)  H  19  05:50    


 


MCHC  34 % (32-34)   19  05:50    


 


RDW  14.1 % (13.2-15.2)   19  05:50    


 


Plt Count  229 K/mm3 (140-440)   19  05:50    


 


Lymph % (Auto)  14.1 % (13.4-35.0)   19  05:50    


 


Mono % (Auto)  14.2 % (0.0-7.3)  H  19  05:50    


 


Eos % (Auto)  3.3 % (0.0-4.3)   19  05:50    


 


Baso % (Auto)  1.1 % (0.0-1.8)   19  05:50    


 


Lymph #  1.1 K/mm3 (1.2-5.4)  L  19  05:50    


 


Mono #  1.1 K/mm3 (0.0-0.8)  H  19  05:50    


 


Eos #  0.3 K/mm3 (0.0-0.4)   19  05:50    


 


Baso #  0.1 K/mm3 (0.0-0.1)   19  05:50    


 


Seg Neutrophils %  67.3 % (40.0-70.0)   19  05:50    


 


Seg Neutrophils #  5.5 K/mm3 (1.8-7.7)   19  05:50    


 


Sodium  138 mmol/L (137-145)   19  05:50    


 


Potassium  4.2 mmol/L (3.6-5.0)   19  05:50    


 


Chloride  99.0 mmol/L ()   19  05:50    


 


Carbon Dioxide  25 mmol/L (22-30)   19  05:50    


 


  18 mmol/L  19  05:50    


 


BUN  9 mg/dL (9-20)   19  05:50    


 


  0.8 mg/dL (0.8-1.5)   19  05:50    


 


Estimated GFR  > 60 ml/min  19  05:50    


 


  11 %  19  05:50    


 


Glucose  61 mg/dL ()  L  19  05:50    


 


Lactic Acid  0.60 mmol/L (0.7-2.0)  L  19  04:16    


 


Calcium  8.9 mg/dL (8.4-10.2)   19  05:50    


 


  0.60 mg/dL (0.1-1.2)   19  03:19    


 


AST  14 units/L (5-40)   19  03:19    


 


ALT  18 units/L (7-56)   19  03:19    


 


  39 units/L ()   19  03:19    


 


  6.2 g/dL (6.3-8.2)  L  19  03:19    


 


  3.9 g/dL (3.9-5)   19  03:19    


 


  1.7 %  19  03:19    


 


TSH  12.760 mlU/mL (0.270-4.200)  H  19  09:57    


 


  Yellow  (Yellow)   19  04:22    


 


  Clear  (Clear)   19  04:22    


 


  5.0  (5.0-7.0)   19  04:22    


 


Ur Specific Gravity  1.016  (1.003-1.030)   19  04:22    


 


  <15 mg/dl mg/dL (Negative)   19  04:22    


 


  Neg mg/dL (Negative)   19  04:22    


 


  Neg mg/dL (Negative)   19  04:22    


 


  Neg  (Negative)   19  04:22    


 


  Neg  (Negative)   19  04:22    


 


  Neg  (Negative)   19  04:22    


 


  < 2.0 mg/dL (<2.0)   19  04:22    


 


Ur Leukocyte Esterase  Tr  (Negative)   19  04:22    


 


  1.0 /HPF (0.0-6.0)   19  04:22    


 


  1.0 /HPF (0.0-6.0)   19  04:22    














Active Medications





- Current Medications


Current Medications: 














Generic Name Dose Route Start Last Admin





  Trade Name Freq  PRN Reason Stop Dose Admin


 


Acetaminophen  650 mg  19 11:40 





  Tylenol  PO  





  Q4H PRN  





  Pain MILD(1-3)/Fever >100.5/HA  


 


Docusate Sodium  100 mg  19 22:00  19 21:50





  Colace  PO   100 mg





  BID YANIRA   Administration


 


Enoxaparin Sodium  40 mg  19 10:00  19 12:03





  Lovenox  SUB-Q   Not Given





  QDAY [facility]  


 


Sodium Chloride  1,000 mls @ 75 mls/hr  19 12:00  19 04:58





  Nacl 0.9% 1000 Ml  IV   75 mls/hr





  AS DIRECT YANIRA   Administration


 


Metronidazole  500 mg in 100 mls @ 100 mls/hr  19 14:00  19 05:00





  Flagyl 500 Mg/100 Ml  IV   100 mls/hr





  Q8HR YANIRA   Administration





  Protocol  


 


Ceftriaxone Sodium  2 gm in 100 mls @ 200 mls/hr  19 16:00  19 12:02





  Rocephin/Ns 2 Gm/100 Ml  IV   200 mls/hr





  Q24HR YANIRA   Administration





  Protocol  


 


Morphine Sulfate  1 mg  19 15:42  19 16:46





  Morphine  IV   1 mg





  Q4H PRN   Administration





  Pain, Moderate (4-6)  


 


Ondansetron HCl  4 mg  19 11:40 





  Zofran  IV  





  Q8H PRN  





  Nausea And Vomiting  


 


Oxycodone/Acetaminophen  1 tab  19 11:40  19 21:50





  Percocet 5/325  PO   1 tab





  Q6H PRN   Administration





  Pain, Moderate (4-6)  


 


Sodium Chloride  10 ml  19 22:00  19 21:52





  Sodium Chloride Flush Syringe 10 Ml  IV   10 ml





  BID YANIRA   Administration


 


Sodium Chloride  10 ml  19 11:40 





  Sodium Chloride Flush Syringe 10 Ml  IV  





  PRN PRN  





  LINE FLUSH  














Nutrition/Malnutrition Assess





- Dietary Evaluation


Nutrition/Malnutrition Findings: 


                                        





Nutrition Notes                                            Start:  19 

14:29


Freq:                                                      Status: Active       




Protocol:                                                                       




 Document     19 14:29  FAREED  (Rec: 19 14:37  FAREED  [id number]

1)


 Nutrition Notes


     Need for Assessment generated from:         MD Order,RN Referral,MST


     Initial or Follow up                        Assessment


     Other Pertinent Diagnosis                   Acute diverticulitis


     Current Diet                                NPO


     Labs/Tests                                  reviewed


     Pertinent Medications                       Colace


     Height                                      6 ft 3 in


     Weight                                      96.82 kg


     Usual Body Weight                           97 kg


     Ideal Body Weight (kg)                      89.09


     BMI                                         26.6


     Intake Prior to Admission                   Fair


     Weight Status                               Appropriate


     Subjective/Other Information                RD consulted for poor oral


                                                 intake; pt also screened for


                                                 malnutrition risk (wt loss,


                                                 poor appetite) and skin risk (


                                                 Charles score: 20).  He reports


                                                 last PO meal on .  He


                                                 admits to consuming a low


                                                 fiber diet at home.  Very


                                                 receptive to diet education.


                                                 Being followed by surgery.


     Burn                                        Absent


     Trauma                                      Absent


     #1


      Nutrition Diagnosis                        Altered GI function


      Etiology                                   diverticulitis


      As Evidenced by Signs and Symptoms         pt NPO since 


     Is patient on ventilator?                   No


     Is Patient Ambulatory and/or Out of Bed     Yes


     [facility] [     2611.479


      [id number]]                               


     Calculation Used for Recommendations        [facility]


     Additional Notes                            Pro needs 0.8-1g/k-96g/


                                                 day


                                                 Fluid needs 1ml/kcal


 Nutrition Intervention


     Change Diet Order:                          Diet advancement when


                                                 medically feasible


     Teaching Recipient                          Patient


     Learning Readiness                          Good


     Teaching Methods                            Discussion,Handout


     Response to Teaching                        Verbalize understanding


     Education Handouts Provided                 Low-Fiber Nutrition Therapy


                                                 High-Fiber Nutrition Therapy


     Barriers to Learning                        No Barriers


     RD phone number provided                    Yes


     Patient aware of follow up options          Yes


     Goal #1                                     Pt to increase daily dietary


                                                 fiber intake when


                                                 diverticulitis resolved


     Anticipated Discharge Needs:                High-Fiber diet


     Follow-Up By:                               19


     Additional Comments                         F/U: diet advancement ED Screening Note


Date of service: 10/20/20


Time: 17:30


ED Screening Note: 





Pt c/o sudden onset of chest pain x today


denies hx of MI/CVA/DVT/PE


states she takes unknown medication for hot flashes, but denies the medication 

being a hormone








This initial assessment/diagnostic orders/clinical plan/treatment(s) is/are 

subject to change based on patients health status, clinical progression and re-

assessment by fellow clinical providers in the ED. Further treatment and workup 

at subsequent clinical providers discretion. Patient/guardian urged not to elope

from the ED as their condition may be serious if not clinically assessed and 

managed. 





Initial orders include: 


CXR


EKG


labs

## 2021-03-17 ENCOUNTER — HOSPITAL ENCOUNTER (EMERGENCY)
Dept: HOSPITAL 5 - ED | Age: 31
Discharge: HOME | End: 2021-03-17
Payer: COMMERCIAL

## 2021-03-17 VITALS — DIASTOLIC BLOOD PRESSURE: 105 MMHG | SYSTOLIC BLOOD PRESSURE: 131 MMHG
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
## 2021-03-17 DIAGNOSIS — Z79.899: ICD-10-CM

## 2021-03-17 DIAGNOSIS — F17.200: ICD-10-CM

## 2021-03-17 DIAGNOSIS — S83.8X2A: Primary | ICD-10-CM

## 2021-03-17 DIAGNOSIS — S16.1XXA: ICD-10-CM
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
## 2021-03-17 DIAGNOSIS — F12.90: ICD-10-CM
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
## 2021-03-17 DIAGNOSIS — E03.9: ICD-10-CM
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
## 2022-01-07 ENCOUNTER — HOSPITAL ENCOUNTER (EMERGENCY)
Dept: HOSPITAL 5 - ED | Age: 32
Discharge: HOME | End: 2022-01-07
Payer: SELF-PAY

## 2022-01-07 VITALS — SYSTOLIC BLOOD PRESSURE: 131 MMHG | DIASTOLIC BLOOD PRESSURE: 94 MMHG

## 2022-01-07 DIAGNOSIS — F17.200: ICD-10-CM

## 2022-01-07 DIAGNOSIS — F12.90: ICD-10-CM

## 2022-01-07 DIAGNOSIS — K57.32: Primary | ICD-10-CM

## 2022-01-07 LAB
ALBUMIN SERPL-MCNC: (no result) G/DL (ref 3.9–5)
ALBUMIN SERPL-MCNC: 4.5 G/DL (ref 3.9–5)
ALT SERPL-CCNC: (no result) UNITS/L (ref 7–56)
ALT SERPL-CCNC: 7 UNITS/L (ref 7–56)
BASOPHILS # (AUTO): 0.1 K/MM3 (ref 0–0.1)
BASOPHILS NFR BLD AUTO: 0.7 % (ref 0–1.8)
BILIRUB DIRECT SERPL-MCNC: (no result) MG/DL (ref 0–0.2)
BILIRUB UR QL STRIP: (no result)
BLOOD UR QL VISUAL: (no result)
BUN SERPL-MCNC: (no result) MG/DL (ref 9–20)
BUN SERPL-MCNC: 6 MG/DL (ref 9–20)
BUN/CREAT SERPL: (no result) %
BUN/CREAT SERPL: 7 %
CALCIUM SERPL-MCNC: (no result) MG/DL (ref 8.4–10.2)
CALCIUM SERPL-MCNC: 9.2 MG/DL (ref 8.4–10.2)
EOSINOPHIL # BLD AUTO: 0.3 K/MM3 (ref 0–0.4)
EOSINOPHIL NFR BLD AUTO: 2.2 % (ref 0–4.3)
HCT VFR BLD CALC: 39.3 % (ref 35.5–45.6)
HEMOLYSIS INDEX: (no result)
HEMOLYSIS INDEX: 9
HGB BLD-MCNC: 13.5 GM/DL (ref 11.8–15.2)
LYMPHOCYTES # BLD AUTO: 1.5 K/MM3 (ref 1.2–5.4)
LYMPHOCYTES NFR BLD AUTO: 13.5 % (ref 13.4–35)
MCHC RBC AUTO-ENTMCNC: 35 % (ref 32–34)
MCV RBC AUTO: 95 FL (ref 84–94)
MONOCYTES # (AUTO): 1.1 K/MM3 (ref 0–0.8)
MONOCYTES % (AUTO): 9.4 % (ref 0–7.3)
MUCOUS THREADS #/AREA URNS HPF: (no result) /HPF
PH UR STRIP: 6 [PH] (ref 5–7)
PLATELET # BLD: 323 K/MM3 (ref 140–440)
PROT UR STRIP-MCNC: (no result) MG/DL
RBC # BLD AUTO: 4.11 M/MM3 (ref 3.65–5.03)
RBC #/AREA URNS HPF: 24 /HPF (ref 0–6)
UROBILINOGEN UR-MCNC: < 2 MG/DL (ref ?–2)
WBC #/AREA URNS HPF: 1 /HPF (ref 0–6)

## 2022-01-07 PROCEDURE — 74177 CT ABD & PELVIS W/CONTRAST: CPT

## 2022-01-07 PROCEDURE — 80053 COMPREHEN METABOLIC PANEL: CPT

## 2022-01-07 PROCEDURE — 36415 COLL VENOUS BLD VENIPUNCTURE: CPT

## 2022-01-07 PROCEDURE — 96375 TX/PRO/DX INJ NEW DRUG ADDON: CPT

## 2022-01-07 PROCEDURE — 85025 COMPLETE CBC W/AUTO DIFF WBC: CPT

## 2022-01-07 PROCEDURE — 80076 HEPATIC FUNCTION PANEL: CPT

## 2022-01-07 PROCEDURE — 81001 URINALYSIS AUTO W/SCOPE: CPT

## 2022-01-07 PROCEDURE — 96374 THER/PROPH/DIAG INJ IV PUSH: CPT

## 2022-01-07 PROCEDURE — 99284 EMERGENCY DEPT VISIT MOD MDM: CPT

## 2022-01-07 PROCEDURE — 80048 BASIC METABOLIC PNL TOTAL CA: CPT

## 2022-01-07 NOTE — CAT SCAN REPORT
CT OF THE ABDOMEN AND PELVIS WITH INTRAVENOUS CONTRAST 



INDICATION / CLINICAL INFORMATION: LLQ pain/hx diverticulitis.



TECHNIQUE: The patient received 100 cc Omnipaque 300 intravenously. All CT scans at this location are
 performed using CT dose reduction for ALARA by means of automated exposure control. 



COMPARISON: 08/22/19.



FINDINGS:

ABDOMEN: The liver, spleen, gallbladder, bile ducts, pancreas, adrenal glands and kidneys are normal.
 I see no evidence of bowel obstruction or free air. No adenopathy is present. No acute vascular abno
rmality is seen. The lung bases are clear.



PELVIS: There are scattered sigmoid diverticula. There is acute inflammation involving the proximal s
igmoid colon anteriorly. There is associated bowel wall thickening and pericolonic soft tissue strand
ing. I see no evidence of abscess, extraluminal gas or bowel obstruction. The inflammatory changes ar
e in the same region as on the 2019 study.



The distal ureters, urinary bladder and prostate gland are normal. A normal appendix is present. No a
bnormal mass or fluid collection is seen. I do not identify a hernia. No acute osseous abnormality is
 seen.



IMPRESSION: Mild to moderate acute, uncomplicated diverticulitis involving the proximal sigmoid colon
. 



Signer Name: Girma Paniagua MD 

Signed: 1/7/2022 4:26 PM

Workstation Name: [id number]

## 2022-01-07 NOTE — EMERGENCY DEPARTMENT REPORT
ED Abdominal Pain HPI





- General


Chief Complaint: Abdominal Pain


Stated Complaint: DIVERTICULITIS/THYROID


PUI?: No


Time Seen by Provider: 22 13:57


Source: patient


Mode of arrival: Ambulatory


Limitations: No Limitations





- History of Present Illness


Initial Comments: 


31-year-old male presents to the ER today with complaints of left lower quadrant

pain.  Patient states that he was diagnosed with diverticulitis the beginning of

last year.  He states that he was admitted, and treated medically and when he 

was discharged he was prescribed antibiotics, but he states that he couldn't 

afford it and never got it filled.  He states that he also never followed up 

with a specialist that he was referred to.  Patient states that he has been 

having this pain since he was diagnosed with diverticulitis but the main reason 

he came in today is because he can no longer tolerate the pain.  He states that 

the pain radiates into his right thumb and also into his testicle at times.  He 

states that his testicles feel tight, but denies any pain or swelling to them.  

He reports nausea but no vomiting.  His last bowel movement was this morning and

normal.  He denies any dysuria, hematuria or frequency.  He reports no penile 

discharge.  He denies any fever or chills..


MD Complaint: abdominal pain


-: month(s)





- Related Data


                                  Previous Rx's











 Medication  Instructions  Recorded  Last Taken  Type


 


Naproxen 500 mg PO BID PRN #14 tablet 21 Unknown Rx


 


methOCARBAMOL [Robaxin TAB] 1,000 mg PO TID PRN #20 tab 21 Unknown Rx


 


Ciprofloxacin HCl 500 mg PO BID #20 tab 22 Unknown Rx


 


HYDROcodone/APAP 5-325 [Tatamy 1 each PO Q6HR PRN #12 tablet 22 Unknown Rx





5/325]    


 


Levothyroxine [Synthroid] 75 mcg PO QAM #30 22 Unknown Rx


 


Ondansetron [Zofran Odt] 4 mg PO Q8HR #15 tab.rapdis 22 Unknown Rx


 


metroNIDAZOLE [Flagyl] 500 mg PO Q6HR #40 tablet 22 Unknown Rx











                                    Allergies











Allergy/AdvReac Type Severity Reaction Status Date / Time


 


No Known Allergies Allergy   Verified 21 13:07














ED Review of Systems


ROS: 


Stated complaint: DIVERTICULITIS/THYROID


Other details as noted in HPI





Comment: All other systems reviewed and negative


Constitutional: denies: chills, fever


Eyes: denies: eye pain, eye discharge, vision change


ENT: denies: ear pain, throat pain


Respiratory: denies: cough, shortness of breath, SOB with exertion, SOB at rest,

 wheezing


Cardiovascular: denies: chest pain, palpitations


Gastrointestinal: abdominal pain, nausea.  denies: vomiting, diarrhea, 

constipation, hematemesis, melena, hematochezia


Genitourinary: testicular pain.  denies: urgency, dysuria, frequency, hematuria,

 discharge, testicular mass


Musculoskeletal: denies: back pain, joint swelling, arthralgia


Skin: denies: rash, lesions, change in color, change in hair/nails, pruritus


Neurological: denies: headache, weakness, numbness, paresthesias, confusion, 

abnormal gait, vertigo


Psychiatric: denies: anxiety, depression, auditory hallucinations, visual 

hallucinations, homicidal thoughts, suicidal thoughts


Hematological/Lymphatic: denies: easy bleeding, easy bruising, swollen glands





ED Past Medical Hx





- Past Medical History


Additional medical history: Hypothyroidism





- Surgical History


Past Surgical History?: No





- Social History


Smoking Status: Current Every Day Smoker


Substance Use Type: Marijuana





- Medications


Home Medications: 


                                Home Medications











 Medication  Instructions  Recorded  Confirmed  Last Taken  Type


 


Naproxen 500 mg PO BID PRN #14 tablet 21  Unknown Rx


 


methOCARBAMOL [Robaxin TAB] 1,000 mg PO TID PRN #20 tab 21  Unknown Rx


 


Ciprofloxacin HCl 500 mg PO BID #20 tab 22  Unknown Rx


 


HYDROcodone/APAP 5-325 [Tatamy 1 each PO Q6HR PRN #12 tablet 22  Unknown Rx





5/325]     


 


Levothyroxine [Synthroid] 75 mcg PO QAM #30 22  Unknown Rx


 


Ondansetron [Zofran Odt] 4 mg PO Q8HR #15 tab.rapdis 22  Unknown Rx


 


metroNIDAZOLE [Flagyl] 500 mg PO Q6HR #40 tablet 22  Unknown Rx














ED Physical Exam





- General


Limitations: No Limitations


General appearance: alert, in no apparent distress





- Head


Head exam: Present: atraumatic, normocephalic, normal inspection





- Eye


Eye exam: Present: normal appearance, PERRL, EOMI


Pupils: Present: normal accommodation





- Neck


Neck exam: Present: normal inspection, full ROM.  Absent: meningismus





- Respiratory


Respiratory exam: Present: normal lung sounds bilaterally.  Absent: respiratory 

distress, wheezes, rales, rhonchi





- Cardiovascular


Cardiovascular Exam: Present: regular rate, normal rhythm, normal heart sounds





- GI/Abdominal


GI/Abdominal exam: Present: soft, tenderness (LLQ with mild guarding ).  Absent:

 distended





- 


 exam: Present: normal inspection, other (Chaperone present ).  Absent: 

testicular tenderness, urethral discharge, scrotal swelling, vertical testicular

 lie, circumcision


External exam: Present: normal external exam





- Neurological Exam


Neurological exam: Present: alert, oriented X3, CN II-XII intact, normal gait.  

Absent: motor sensory deficit





- Psychiatric


Psychiatric exam: Present: normal affect, normal mood





- Skin


Skin exam: Present: intact





ED Course


                                   Vital Signs











  22





  13:54 14:22


 


Temperature 98.3 F 


 


Pulse Rate 73 


 


Respiratory 18 16





Rate  


 


Blood Pressure 131/98 


 


O2 Sat by Pulse 100 





Oximetry  














ED Medical Decision Making





- Lab Data


Result diagrams: 


                                 22 14:15





                                 22 16:38





- Radiology Data


Radiology results: report reviewed


Patient: RAJEEV EDUARDO                                                   

             MR#: [id number]  


[id number]          


: 1990                                                                

Acct:[id number]      


 


Age/Sex: 31 / M                                                                

ADM Date: 22     


 


Loc: ED       


Attending Dr:   


 


 


Ordering Physician: LORENZO BECKFORD  


Date of Service: 22  


Procedure(s): CT abdomen pelvis w con  


Accession Number(s): [id number]  


 


cc: LORENZO BECKFORD   


 


 


CT OF THE ABDOMEN AND PELVIS WITH INTRAVENOUS CONTRAST   


 


 INDICATION / CLINICAL INFORMATION: LLQ pain/hx diverticulitis.  


 


 TECHNIQUE: The patient received 100 cc Omnipaque 300 intravenously. All CT 

scans at this location 


are performed using CT dose reduction for ALARA by means of automated exposure 

control.   


 


 COMPARISON: 19.  


 


 FINDINGS:  


 ABDOMEN: The liver, spleen, gallbladder, bile ducts, pancreas, adrenal glands 

and kidneys are 


normal. I see no evidence of bowel obstruction or free air. No adenopathy is 

present. No acute 


vascular abnormality is seen. The lung bases are clear.  


 


 PELVIS: There are scattered sigmoid diverticula. There is acute inflammation 

involving the proximal


sigmoid colon anteriorly. There is associated bowel wall thickening and pericolo

pallavi soft tissue 


stranding. I see no evidence of abscess, extraluminal gas or bowel obstruction. 

The inflammatory 


changes are in the same region as on the 2019 study.  


 


 The distal ureters, urinary bladder and prostate gland are normal. A normal 

appendix is present. No


abnormal mass or fluid collection is seen. I do not identify a hernia. No acute 

osseous abnormality 


is seen.  


 


 IMPRESSION: Mild to moderate acute, uncomplicated diverticulitis involving the 

proximal sigmoid 


colon.   


 


 Signer Name: Girma Paniagua MD   


 Signed: 2022 4:26 PM  


 Workstation Name: [id number]   


 


 


Transcribed By: RT  


Dictated By: Girma Paniagua MD  


Electronically Authenticated By: Girma Paniagua MD    


Signed Date/Time: 22                                


 


 


 


DD/DT: 22                                                            

  


TD/TT:








- Medical Decision Making


Labs reviewed, no significant abnormality on labs today.  CT abdomen pelvis sh

ows uncomplicated diverticulitis in the sigmoid colon.  Patient has been 

observed interactive with other patients in the reassessment room.  He is not in

 any significant distress.  He is not toxic or ill-appearing.  He is 

neurologically intact with a normal gait.  He had no testicular swelling or pain

 on exam.  His vital signs are stable.  Discussed results with patient, he will 

be started on Cipro and Flagyl and informed him that his point and that he takes

 his medication and to completion.  He will be given medication also to help his

 pain and also referral to GI for further evaluation and possible colonoscopy.  

Also recommend that he follows up with his PCP.  Patient requesting a refill on 

his Synthroid and this was also given today.  Patient expressed understanding of

 all instructions and agree with plan.  Patient was stable at time of discharge.





- Differential Diagnosis


Diverticulitis, UTI, electrolyte abnormality


Critical care attestation.: 


If time is entered above; I have spent that time in minutes in the direct care 

of this critically ill patient, excluding procedure time.








ED Disposition


Clinical Impression: 


 Diverticulitis large intestine





Disposition:  HOME / SELF CARE / HOMELESS


Is pt being admited?: No


Condition: Stable


Instructions:  Diverticulitis, Easy-to-Read


Additional Instructions: 


Recommend that you take the Flagyl and Cipro as prescribed until completion.  

Take the hydrocodone to help with any pain.  Drink lots of fluids.  Increase yo

ur fiber intake.  I do recommend that you follow-up with a GI specialist listed 

on discharge instructions as well as the primary care doctor.  Return to the ER 

if your symptoms worsens or changes in any way.


Prescriptions: 


Ciprofloxacin HCl 500 mg PO BID #20 tab


metroNIDAZOLE [Flagyl] 500 mg PO Q6HR #40 tablet


HYDROcodone/APAP 5-325 [Tatamy 5/325] 1 each PO Q6HR PRN #12 tablet


 PRN Reason: Pain


Levothyroxine [Synthroid] 75 mcg PO QAM #30


Ondansetron [Zofran Odt] 4 mg PO Q8HR #15 tab.justina


Referrals: 


BRENDAN FELIX MD [Staff Physician] - 3-5 Days


[facility] [Provider Group] - 3-5 Days


Forms:  Work/School Release Form(ED)


Time of Disposition: 17:38

## 2023-08-02 ENCOUNTER — LAB OUTSIDE AN ENCOUNTER (OUTPATIENT)
Dept: URBAN - METROPOLITAN AREA CLINIC 118 | Facility: CLINIC | Age: 33
End: 2023-08-02

## 2023-08-02 ENCOUNTER — DASHBOARD ENCOUNTERS (OUTPATIENT)
Age: 33
End: 2023-08-02

## 2023-08-02 ENCOUNTER — OFFICE VISIT (OUTPATIENT)
Dept: URBAN - METROPOLITAN AREA CLINIC 118 | Facility: CLINIC | Age: 33
End: 2023-08-02
Payer: COMMERCIAL

## 2023-08-02 VITALS
BODY MASS INDEX: 20.14 KG/M2 | SYSTOLIC BLOOD PRESSURE: 128 MMHG | DIASTOLIC BLOOD PRESSURE: 80 MMHG | HEIGHT: 75 IN | TEMPERATURE: 99.3 F | HEART RATE: 86 BPM | WEIGHT: 162 LBS

## 2023-08-02 DIAGNOSIS — R10.33 ABDOMINAL PAIN, ACUTE, PERIUMBILICAL: ICD-10-CM

## 2023-08-02 DIAGNOSIS — K62.5 RECTAL BLEEDING: ICD-10-CM

## 2023-08-02 DIAGNOSIS — Z87.19 HISTORY OF DIVERTICULITIS: ICD-10-CM

## 2023-08-02 PROCEDURE — 99204 OFFICE O/P NEW MOD 45 MIN: CPT | Performed by: INTERNAL MEDICINE

## 2023-08-02 RX ORDER — POLYETHYLENE GLYCOL 3350, SODIUM SULFATE ANHYDROUS, SODIUM BICARBONATE, SODIUM CHLORIDE, POTASSIUM CHLORIDE 236; 22.74; 6.74; 5.86; 2.97 G/4L; G/4L; G/4L; G/4L; G/4L
ML POWDER, FOR SOLUTION ORAL
Qty: 1 | Refills: 0 | OUTPATIENT
Start: 2023-08-02 | End: 2023-08-03

## 2023-08-02 NOTE — PHYSICAL EXAM GASTROINTESTINAL
Abdomen , soft, mildly TTP over lower abdomen, nondistended , no guarding or rigidity , no masses palpable , normal bowel sounds , Liver and Spleen , no hepatosplenomegaly present, liver nontender Rectal  deferred

## 2023-08-02 NOTE — HPI-TODAY'S VISIT:
Mr. Bella is a 34 y/o AAM who presents today for evaluation of diverticulitis. Pt referred by PCP and a copy of this documentation will be sent to the referring provider. Patient is a poor historian.  First episode of diverticulitis dx in 2019 by CT A/P. Since then, per record review patient has had 4 subsequent episodes with CT A/P each time. Most recent episode 4/10/23 which showed free pelvic fluid is evident with findings suggestive of diverticulitis, with a phlegmon and/or abscess developing. They recommended f/u imaging. No prior colonoscopy.  Patient still with lower abdominal pain that comes and goes, not daily but 5-6 days per week. Not as severe as pain that initially brought him to the ER. Notes intermittent BRBPR depending on his diet. Has a BM every AM, ranges from formed to soft/ loose. Denies any diarrhea, fever or unintentional weight loss. No known FH of CRC.

## 2023-09-11 ENCOUNTER — TELEPHONE ENCOUNTER (OUTPATIENT)
Dept: URBAN - METROPOLITAN AREA CLINIC 118 | Facility: CLINIC | Age: 33
End: 2023-09-11

## 2023-09-11 RX ORDER — METRONIDAZOLE 500 MG/1
1 TABLET TABLET ORAL THREE TIMES A DAY
Qty: 30 | Refills: 0 | OUTPATIENT
Start: 2023-09-11 | End: 2023-09-21

## 2023-09-11 RX ORDER — CIPROFLOXACIN 500 MG/1
1 TABLET TABLET, FILM COATED ORAL
Qty: 20 TABLET | Refills: 0 | OUTPATIENT
Start: 2023-09-11 | End: 2023-09-21

## 2023-09-27 ENCOUNTER — OFFICE VISIT (OUTPATIENT)
Dept: URBAN - METROPOLITAN AREA MEDICAL CENTER 16 | Facility: MEDICAL CENTER | Age: 33
End: 2023-09-27
Payer: COMMERCIAL

## 2023-09-27 DIAGNOSIS — Z87.19 ESOPHAGEAL FOOD BOLUS: ICD-10-CM

## 2023-09-27 DIAGNOSIS — K63.5 BENIGN COLON POLYP: ICD-10-CM

## 2023-09-27 DIAGNOSIS — R10.30 ABDOMINAL PAIN OF UNKNOWN CAUSE: ICD-10-CM

## 2023-09-27 PROCEDURE — 45331 SIGMOIDOSCOPY AND BIOPSY: CPT | Performed by: INTERNAL MEDICINE
